# Patient Record
Sex: FEMALE | Race: BLACK OR AFRICAN AMERICAN | NOT HISPANIC OR LATINO
[De-identification: names, ages, dates, MRNs, and addresses within clinical notes are randomized per-mention and may not be internally consistent; named-entity substitution may affect disease eponyms.]

---

## 2017-01-25 ENCOUNTER — APPOINTMENT (OUTPATIENT)
Dept: NEUROLOGY | Facility: CLINIC | Age: 42
End: 2017-01-25

## 2017-01-30 ENCOUNTER — APPOINTMENT (OUTPATIENT)
Dept: OTOLARYNGOLOGY | Facility: CLINIC | Age: 42
End: 2017-01-30

## 2017-01-30 VITALS
WEIGHT: 120 LBS | BODY MASS INDEX: 18.83 KG/M2 | SYSTOLIC BLOOD PRESSURE: 104 MMHG | HEART RATE: 72 BPM | HEIGHT: 67 IN | DIASTOLIC BLOOD PRESSURE: 62 MMHG

## 2017-04-24 ENCOUNTER — APPOINTMENT (OUTPATIENT)
Dept: OTOLARYNGOLOGY | Facility: CLINIC | Age: 42
End: 2017-04-24

## 2018-04-21 ENCOUNTER — EMERGENCY (EMERGENCY)
Facility: HOSPITAL | Age: 43
LOS: 1 days | End: 2018-04-21
Attending: EMERGENCY MEDICINE
Payer: MEDICAID

## 2018-04-21 VITALS
TEMPERATURE: 98 F | RESPIRATION RATE: 17 BRPM | DIASTOLIC BLOOD PRESSURE: 77 MMHG | OXYGEN SATURATION: 100 % | HEART RATE: 71 BPM | SYSTOLIC BLOOD PRESSURE: 122 MMHG

## 2018-04-21 PROCEDURE — 99282 EMERGENCY DEPT VISIT SF MDM: CPT

## 2018-04-21 PROCEDURE — 99283 EMERGENCY DEPT VISIT LOW MDM: CPT

## 2018-04-21 RX ORDER — IBUPROFEN 200 MG
600 TABLET ORAL ONCE
Qty: 0 | Refills: 0 | Status: COMPLETED | OUTPATIENT
Start: 2018-04-21 | End: 2018-04-21

## 2018-04-21 RX ORDER — LIDOCAINE 4 G/100G
1 CREAM TOPICAL ONCE
Qty: 0 | Refills: 0 | Status: COMPLETED | OUTPATIENT
Start: 2018-04-21 | End: 2018-04-21

## 2018-04-21 NOTE — ED PROVIDER NOTE - MEDICAL DECISION MAKING DETAILS
Pt with multiple chronic problems in ED today with pain in left upper back worse with movement of left arm ant pressing over left trapezius ambulatory very labile mood refusing ALL meds. Advised NSAIDs Lidocaine patch will reassess

## 2018-04-21 NOTE — ED PROVIDER NOTE - ATTENDING CONTRIBUTION TO CARE
I have seen and evaluated this patient with the advance practice clinician.   I agree with the findings  unless other wise stated. I have amended notes where needed.  After my face to face bedside evaluation, I am noting: Pt with multiple chronic problems in ED today with pain in left upper back worse with movement of left arm ant pressing over left trapezius ambulatory very labile mood refusing ALL meds. Advised NSAIDs Lidocaine patch will reassess --Hurt

## 2018-04-21 NOTE — ED PROVIDER NOTE - PROGRESS NOTE DETAILS
Stable. AAOX3, NAD, VSS.   Pt with spouse. Discussed need good follow up with PCP and neurologist, pt not agreeable to plan for pain medication ibuprofen, lidoderm patch for relief of pain. She states "I don't like to take medications." Patient able to ambulate w/o difficulty.  The patient has decided to leave against medical advice.  The patient is AAOx3, not intoxicated, and displays normal decision making ability. Pt eloped.

## 2018-04-21 NOTE — ED PROVIDER NOTE - OBJECTIVE STATEMENT
43 Yo female Pmhx Menieres disease (right ear hearing loss), cluster headaches, [place] disease, Cervical herniation 10 years ago, LMP 3/2/18 presents to ED c/o head and neck pain x 1 month. She notes intermittent left neck pain "crampy" that radiates up posterior neck to head, and weakness in left shoulder. She notes blurry vision occasionally.  Denies numbness, tingling sensation down arm. She has not followed up with her neurologist since one year ago stating, she does not like taking medications, noritriptline gives her side effects of SOB and palpitations. She does not take any OTC pain medications. Denies fever, chills, trauma, CP, SOB, n/v. Pt states does not have a PCP. Pt saw ENT specialist 2 weeks ago, was given meclizine. She states she currently does not have neck pain and does not want tylenol or motrin.       Neurologist: Dr. Joby Lindsay

## 2018-04-21 NOTE — ED ADULT NURSE NOTE - OBJECTIVE STATEMENT
42 year old male presents to the ED complaining of neck and head pain. As per patient she has a history of cluster headaches, Meniere's disease, and a herniation x 10 years ago on C5 and C6 that 'went away on it's own for years'. On neurological assessment no neuro or focal deficits noted.  Pt also complains of blurred vision. Pt is A&O x 4, VSS, afebrile, ambulating independently. Pt denies fever, chills, NVD, SOB, or chest pain.  at bedside.

## 2018-04-21 NOTE — ED PROVIDER NOTE - FAMILY HISTORY
Father  Still living? Unknown  Family history of Meniere's disease, Age at diagnosis: Age Unknown  Family history of vertigo, Age at diagnosis: Age Unknown

## 2018-04-21 NOTE — ED PROVIDER NOTE - CPE EDP GU CVA TENDER
Pt with tenderness over superior border of trapezius trigger points+ no redness no deformity --Hurt/no tenderness

## 2018-04-24 ENCOUNTER — EMERGENCY (EMERGENCY)
Facility: HOSPITAL | Age: 43
LOS: 1 days | Discharge: HOME | End: 2018-04-24
Attending: EMERGENCY MEDICINE
Payer: MEDICAID

## 2018-04-24 VITALS
TEMPERATURE: 98 F | HEART RATE: 72 BPM | WEIGHT: 117.95 LBS | DIASTOLIC BLOOD PRESSURE: 81 MMHG | OXYGEN SATURATION: 100 % | HEIGHT: 67 IN | RESPIRATION RATE: 16 BRPM | SYSTOLIC BLOOD PRESSURE: 115 MMHG

## 2018-04-24 VITALS
RESPIRATION RATE: 16 BRPM | SYSTOLIC BLOOD PRESSURE: 122 MMHG | TEMPERATURE: 98 F | OXYGEN SATURATION: 99 % | HEART RATE: 76 BPM | DIASTOLIC BLOOD PRESSURE: 78 MMHG

## 2018-04-24 PROCEDURE — 99284 EMERGENCY DEPT VISIT MOD MDM: CPT

## 2018-04-24 PROCEDURE — 72125 CT NECK SPINE W/O DYE: CPT

## 2018-04-24 PROCEDURE — 72125 CT NECK SPINE W/O DYE: CPT | Mod: 26

## 2018-04-24 RX ORDER — LIDOCAINE 4 G/100G
1 CREAM TOPICAL ONCE
Qty: 0 | Refills: 0 | Status: COMPLETED | OUTPATIENT
Start: 2018-04-24 | End: 2018-04-24

## 2018-04-24 RX ORDER — IBUPROFEN 200 MG
600 TABLET ORAL ONCE
Qty: 0 | Refills: 0 | Status: COMPLETED | OUTPATIENT
Start: 2018-04-24 | End: 2018-04-24

## 2018-04-24 RX ADMIN — Medication 600 MILLIGRAM(S): at 13:27

## 2018-04-24 RX ADMIN — Medication 600 MILLIGRAM(S): at 13:36

## 2018-04-24 RX ADMIN — LIDOCAINE 1 PATCH: 4 CREAM TOPICAL at 10:35
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
## 2018-04-24 NOTE — ED PROVIDER NOTE - PROGRESS NOTE DETAILS
CT C-spine negative. Pain mildly improved. Patient to follow up with her doctors regarding Meniere's symptoms.
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
## 2018-05-05 ENCOUNTER — EMERGENCY (EMERGENCY)
Facility: HOSPITAL | Age: 43
LOS: 1 days | Discharge: STILL ADMITTED OR ADMINISTRATIVE | End: 2018-05-05
Admitting: EMERGENCY MEDICINE

## 2018-05-05 VITALS
SYSTOLIC BLOOD PRESSURE: 112 MMHG | OXYGEN SATURATION: 100 % | HEART RATE: 88 BPM | DIASTOLIC BLOOD PRESSURE: 71 MMHG | TEMPERATURE: 99 F | RESPIRATION RATE: 20 BRPM

## 2018-05-05 NOTE — ED ADULT TRIAGE NOTE - CHIEF COMPLAINT QUOTE
Pt arrives from home via EMS with NRB in place. Pt states around 10:45pm she began experiencing left sided chest pain accompanied by SOB and felt "pins and needles" sensations in her left facial area.  Pt states she then took 1 baby aspirin and called 911. Pt states in triage CP has resolved by SOB continues.

## 2018-05-10 ENCOUNTER — EMERGENCY (EMERGENCY)
Facility: HOSPITAL | Age: 43
LOS: 1 days | Discharge: HOME | End: 2018-05-10
Attending: EMERGENCY MEDICINE
Payer: MEDICAID

## 2018-05-10 VITALS
OXYGEN SATURATION: 100 % | RESPIRATION RATE: 18 BRPM | DIASTOLIC BLOOD PRESSURE: 70 MMHG | HEART RATE: 70 BPM | SYSTOLIC BLOOD PRESSURE: 115 MMHG

## 2018-05-10 VITALS
RESPIRATION RATE: 18 BRPM | TEMPERATURE: 98 F | SYSTOLIC BLOOD PRESSURE: 129 MMHG | DIASTOLIC BLOOD PRESSURE: 83 MMHG | HEART RATE: 83 BPM | OXYGEN SATURATION: 97 %

## 2018-05-10 PROCEDURE — 93005 ELECTROCARDIOGRAM TRACING: CPT

## 2018-05-10 PROCEDURE — 96374 THER/PROPH/DIAG INJ IV PUSH: CPT

## 2018-05-10 PROCEDURE — 99284 EMERGENCY DEPT VISIT MOD MDM: CPT | Mod: 25

## 2018-05-10 PROCEDURE — 93010 ELECTROCARDIOGRAM REPORT: CPT

## 2018-05-10 RX ORDER — SODIUM CHLORIDE 9 MG/ML
1000 INJECTION INTRAMUSCULAR; INTRAVENOUS; SUBCUTANEOUS ONCE
Qty: 0 | Refills: 0 | Status: COMPLETED | OUTPATIENT
Start: 2018-05-10 | End: 2018-05-10

## 2018-05-10 RX ORDER — FAMOTIDINE 10 MG/ML
20 INJECTION INTRAVENOUS ONCE
Qty: 0 | Refills: 0 | Status: COMPLETED | OUTPATIENT
Start: 2018-05-10 | End: 2018-05-10

## 2018-05-10 RX ORDER — LIDOCAINE 4 G/100G
15 CREAM TOPICAL ONCE
Qty: 0 | Refills: 0 | Status: COMPLETED | OUTPATIENT
Start: 2018-05-10 | End: 2018-05-10

## 2018-05-10 RX ADMIN — Medication 30 MILLILITER(S): at 19:13

## 2018-05-10 RX ADMIN — FAMOTIDINE 20 MILLIGRAM(S): 10 INJECTION INTRAVENOUS at 19:13

## 2018-05-10 RX ADMIN — SODIUM CHLORIDE 1000 MILLILITER(S): 9 INJECTION INTRAMUSCULAR; INTRAVENOUS; SUBCUTANEOUS at 19:14

## 2018-05-10 RX ADMIN — LIDOCAINE 15 MILLILITER(S): 4 CREAM TOPICAL at 19:13

## 2018-05-10 NOTE — ED PROVIDER NOTE - PLAN OF CARE
You have GERD (Gastroesophageal Reflux Disease), also known as reflux. This can be caused by a variety of factors including genetics, bacteria, and types of medicines taken. We recommend Pepcid over-the-counter, 20 mg once per day. In addition, we recommend, Maalox or TUMS with meals 2-3 times per day only as needed if the symptoms are not controlled. In addition, please follow up with your Cardiologist, and your Primary Care Provider (PCP).

## 2018-05-10 NOTE — ED ADULT NURSE NOTE - OBJECTIVE STATEMENT
42 year old female a/ox3 ambulatory presenting to ed with worsening mid epigastric burning that is worse after eating any type of food or drink x 1 week. per patient she begins to have burning that is only relieved after burping or belching. denies fever/chills/chest pain. patient does verbalize shortness of breath intermittently with mild numbness down left arm. has recently has mri of brain due to numbness in arm which patient states was negative. no cardiac hx, non smoker, no Hormonal therapy, no recent long travel. respirations even unlabored no sob/dyspnea abd soft nondistended +bs nontender skin dry warm intact nogueira equally.

## 2018-05-10 NOTE — ED ADULT NURSE REASSESSMENT NOTE - NS ED NURSE REASSESS COMMENT FT1
Report received from MADHAV Scott RN. Pt. is awake, easy work of breathing. VSS. IV fluids at this time. Safety maintained. Will continue to monitor.

## 2018-05-10 NOTE — ED PROVIDER NOTE - MEDICAL DECISION MAKING DETAILS
43 yo F with a PMH Meniere's and Gilbert's disease who presents with 1 week of reflux sxs and SOB/orthopnea. Pt with epigastric abdominal pain, no NSAID use, but sxs are gastritic in appearance and have improved in the past with reflux medication. Therefore, no reason to suspect cardiac etiology if EKG normal. Pt slightly nauseous, dry, overall nontoxic, vitals stable. Will give IVFs, Pepcid, Maalox, viscous lidocaine. No need for labwork unless sxs do not improve with above treatment. Pt with normal neuro exam, should f/u outpatient for neuro sxs. 43 yo F with a PMH Meniere's and Gilbert's disease who presents with 1 week of reflux sxs and SOB/orthopnea. Pt with epigastric abdominal pain, no NSAID use, but sxs are gastritic in appearance and have improved in the past with reflux medication. Therefore, no reason to suspect cardiac etiology if EKG normal. Pt slightly nauseous, dry, overall nontoxic, vitals stable. Will give IVFs, Pepcid, Maalox, viscous lidocaine. No need for labwork unless sxs do not improve with above treatment. Pt with normal neuro exam, should f/u outpatient for neuro sxs.    DELPHINE Castanon MD: Pt is a 41 y/o female with a PMH Meniere's and Gilbert's disease who presents with 1 week of reflux and abdominal burning. The symptoms always occur 10-20 minutes after eating. She also endorses SOB during those periods as well. She denies any chest pain but notes palpitations. Her abdominal pain is epigastric, burning, and radiating upward, to the point she has numbness in the left side of her face. The numbness episodes had also been occurring separately prior to the abdominal symptoms, and they would last a few hours a day every few days for a week. No headache or blurry vision. Last BM was yesterday, no melena. Pt endorses nausea and vomiting, without hematemesis. Last emesis was this morning at 10AM. Pt denies spicy or fatty food intake. She came to the ED 5 days ago at [facility] by ambulance for the same symptoms, however, her sx improved and there was a wait so she LWBS. She was seen by her cardiologist, who performed a Holter monitor, which she returned today. Denies fevers or chills. Does not take any medicines for her symptoms.  She notes she had felt like this 20 years ago and had taken Ranitidine before and felt better. DDx: GERD, gastritis, PUD, pancreatitis, LFT abnormality. Plan: basic labs, GI cocktail, re-assess

## 2018-05-10 NOTE — ED PROVIDER NOTE - CARE PLAN
Principal Discharge DX:	GERD (gastroesophageal reflux disease)  Assessment and plan of treatment:	You have GERD (Gastroesophageal Reflux Disease), also known as reflux. This can be caused by a variety of factors including genetics, bacteria, and types of medicines taken. We recommend Pepcid over-the-counter, 20 mg once per day. In addition, we recommend, Maalox or TUMS with meals 2-3 times per day only as needed if the symptoms are not controlled. In addition, please follow up with your Cardiologist, and your Primary Care Provider (PCP).

## 2018-05-10 NOTE — ED PROVIDER NOTE - OBJECTIVE STATEMENT
43 yo F with a PMH Meniere's and Gilbert's disease who presents with 1 week of reflux and abdominal burning. The symptoms always occur 10-20 minutes after eating. She also endorses SOB during those periods as well. She denies any chest pain but notes palpitations. Her abdominal pain is epigastric, burning, and radiating upward, to the point she has numbness in the left side of her face. The numbness episodes had also been occurring separately prior to the abdominal symptoms, and they would last a few hours a day every few days for a week. No headache or blurry vision. Last BM was yesterday, no melena. Pt endorses nausea and vomiting, without hematemesis. Last emesis was this morning at 10AM. Pt denies spicy or fatty food intake. She came to the ED 5 days ago at [facility] by ambulance for the same symptoms (although her abdominal burning did not radiate at that time) and was placed on oxygen but she left prior to being seen by a physician because her symptoms improved. She was seen by her cardiologist, who performed a Holter monitor, which she returned today. No fevers or chills. She did not take any medicines for her symptoms because she wanted to talk to a physician first. She notes she had felt like this 20 years ago and had taken Ranitidine before and felt better.

## 2018-05-14 ENCOUNTER — APPOINTMENT (OUTPATIENT)
Dept: NEUROLOGY | Facility: CLINIC | Age: 43
End: 2018-05-14
Payer: MEDICAID

## 2018-05-14 VITALS
SYSTOLIC BLOOD PRESSURE: 120 MMHG | BODY MASS INDEX: 18.83 KG/M2 | HEIGHT: 67 IN | HEART RATE: 72 BPM | DIASTOLIC BLOOD PRESSURE: 77 MMHG | WEIGHT: 120 LBS

## 2018-05-14 PROCEDURE — 99215 OFFICE O/P EST HI 40 MIN: CPT

## 2018-05-14 RX ORDER — CYCLOBENZAPRINE HYDROCHLORIDE 5 MG/1
5 TABLET, FILM COATED ORAL
Qty: 30 | Refills: 1 | Status: ACTIVE | COMMUNITY
Start: 2018-05-14 | End: 1900-01-01

## 2018-05-18 NOTE — ED PROVIDER NOTE - NEUROLOGICAL PRONATOR DRIFT
no loss of consciousness, no gait abnormality, no headache, no sensory deficits, and no weakness.
none

## 2018-07-04 ENCOUNTER — EMERGENCY (EMERGENCY)
Facility: HOSPITAL | Age: 43
LOS: 1 days | End: 2018-07-04
Attending: EMERGENCY MEDICINE | Admitting: EMERGENCY MEDICINE
Payer: MEDICAID

## 2018-07-04 VITALS
RESPIRATION RATE: 16 BRPM | WEIGHT: 117.95 LBS | DIASTOLIC BLOOD PRESSURE: 86 MMHG | TEMPERATURE: 98 F | SYSTOLIC BLOOD PRESSURE: 125 MMHG | HEART RATE: 83 BPM | OXYGEN SATURATION: 100 %

## 2018-07-04 VITALS
RESPIRATION RATE: 18 BRPM | DIASTOLIC BLOOD PRESSURE: 73 MMHG | HEART RATE: 72 BPM | SYSTOLIC BLOOD PRESSURE: 115 MMHG | OXYGEN SATURATION: 100 %

## 2018-07-04 LAB
ALBUMIN SERPL ELPH-MCNC: 4.7 G/DL — SIGNIFICANT CHANGE UP (ref 3.3–5)
ALP SERPL-CCNC: 47 U/L — SIGNIFICANT CHANGE UP (ref 40–120)
ALT FLD-CCNC: 20 U/L — SIGNIFICANT CHANGE UP (ref 10–45)
ANION GAP SERPL CALC-SCNC: 16 MMOL/L — SIGNIFICANT CHANGE UP (ref 5–17)
APPEARANCE UR: CLEAR — SIGNIFICANT CHANGE UP
AST SERPL-CCNC: 21 U/L — SIGNIFICANT CHANGE UP (ref 10–40)
BASOPHILS # BLD AUTO: 0.1 K/UL — SIGNIFICANT CHANGE UP (ref 0–0.2)
BASOPHILS NFR BLD AUTO: 1.5 % — SIGNIFICANT CHANGE UP (ref 0–2)
BILIRUB SERPL-MCNC: 2.8 MG/DL — HIGH (ref 0.2–1.2)
BILIRUB UR-MCNC: NEGATIVE — SIGNIFICANT CHANGE UP
BUN SERPL-MCNC: 13 MG/DL — SIGNIFICANT CHANGE UP (ref 7–23)
CALCIUM SERPL-MCNC: 9.9 MG/DL — SIGNIFICANT CHANGE UP (ref 8.4–10.5)
CHLORIDE SERPL-SCNC: 100 MMOL/L — SIGNIFICANT CHANGE UP (ref 96–108)
CO2 SERPL-SCNC: 22 MMOL/L — SIGNIFICANT CHANGE UP (ref 22–31)
COLOR SPEC: YELLOW — SIGNIFICANT CHANGE UP
CREAT SERPL-MCNC: 1.07 MG/DL — SIGNIFICANT CHANGE UP (ref 0.5–1.3)
DIFF PNL FLD: ABNORMAL
EOSINOPHIL # BLD AUTO: 0.2 K/UL — SIGNIFICANT CHANGE UP (ref 0–0.5)
EOSINOPHIL NFR BLD AUTO: 4.6 % — SIGNIFICANT CHANGE UP (ref 0–6)
GLUCOSE SERPL-MCNC: 88 MG/DL — SIGNIFICANT CHANGE UP (ref 70–99)
GLUCOSE UR QL: NEGATIVE — SIGNIFICANT CHANGE UP
HCT VFR BLD CALC: 42.4 % — SIGNIFICANT CHANGE UP (ref 34.5–45)
HGB BLD-MCNC: 15.1 G/DL — SIGNIFICANT CHANGE UP (ref 11.5–15.5)
KETONES UR-MCNC: NEGATIVE — SIGNIFICANT CHANGE UP
LEUKOCYTE ESTERASE UR-ACNC: ABNORMAL
LYMPHOCYTES # BLD AUTO: 1.5 K/UL — SIGNIFICANT CHANGE UP (ref 1–3.3)
LYMPHOCYTES # BLD AUTO: 32.5 % — SIGNIFICANT CHANGE UP (ref 13–44)
MCHC RBC-ENTMCNC: 32.3 PG — SIGNIFICANT CHANGE UP (ref 27–34)
MCHC RBC-ENTMCNC: 35.5 GM/DL — SIGNIFICANT CHANGE UP (ref 32–36)
MCV RBC AUTO: 91 FL — SIGNIFICANT CHANGE UP (ref 80–100)
MONOCYTES # BLD AUTO: 0.5 K/UL — SIGNIFICANT CHANGE UP (ref 0–0.9)
MONOCYTES NFR BLD AUTO: 10.5 % — SIGNIFICANT CHANGE UP (ref 2–14)
NEUTROPHILS # BLD AUTO: 2.4 K/UL — SIGNIFICANT CHANGE UP (ref 1.8–7.4)
NEUTROPHILS NFR BLD AUTO: 50.8 % — SIGNIFICANT CHANGE UP (ref 43–77)
NITRITE UR-MCNC: NEGATIVE — SIGNIFICANT CHANGE UP
PH UR: 6 — SIGNIFICANT CHANGE UP (ref 5–8)
PLATELET # BLD AUTO: 191 K/UL — SIGNIFICANT CHANGE UP (ref 150–400)
POTASSIUM SERPL-MCNC: 4.2 MMOL/L — SIGNIFICANT CHANGE UP (ref 3.5–5.3)
POTASSIUM SERPL-SCNC: 4.2 MMOL/L — SIGNIFICANT CHANGE UP (ref 3.5–5.3)
PROT SERPL-MCNC: 8.1 G/DL — SIGNIFICANT CHANGE UP (ref 6–8.3)
PROT UR-MCNC: 30 MG/DL
RBC # BLD: 4.66 M/UL — SIGNIFICANT CHANGE UP (ref 3.8–5.2)
RBC # FLD: 11.5 % — SIGNIFICANT CHANGE UP (ref 10.3–14.5)
SODIUM SERPL-SCNC: 138 MMOL/L — SIGNIFICANT CHANGE UP (ref 135–145)
SP GR SPEC: >1.03 — HIGH (ref 1.01–1.02)
UROBILINOGEN FLD QL: 1 MG/DL
WBC # BLD: 4.7 K/UL — SIGNIFICANT CHANGE UP (ref 3.8–10.5)
WBC # FLD AUTO: 4.7 K/UL — SIGNIFICANT CHANGE UP (ref 3.8–10.5)

## 2018-07-04 PROCEDURE — 99284 EMERGENCY DEPT VISIT MOD MDM: CPT | Mod: 25

## 2018-07-04 PROCEDURE — 82962 GLUCOSE BLOOD TEST: CPT

## 2018-07-04 PROCEDURE — 85027 COMPLETE CBC AUTOMATED: CPT

## 2018-07-04 PROCEDURE — 81001 URINALYSIS AUTO W/SCOPE: CPT

## 2018-07-04 PROCEDURE — 80053 COMPREHEN METABOLIC PANEL: CPT

## 2018-07-04 PROCEDURE — 99283 EMERGENCY DEPT VISIT LOW MDM: CPT

## 2018-07-04 RX ORDER — SODIUM CHLORIDE 9 MG/ML
500 INJECTION INTRAMUSCULAR; INTRAVENOUS; SUBCUTANEOUS ONCE
Qty: 0 | Refills: 0 | Status: COMPLETED | OUTPATIENT
Start: 2018-07-04 | End: 2018-07-04

## 2018-07-04 RX ORDER — ACETAMINOPHEN 500 MG
500 TABLET ORAL ONCE
Qty: 0 | Refills: 0 | Status: DISCONTINUED | OUTPATIENT
Start: 2018-07-04 | End: 2018-07-08

## 2018-07-04 RX ADMIN — SODIUM CHLORIDE 500 MILLILITER(S): 9 INJECTION INTRAMUSCULAR; INTRAVENOUS; SUBCUTANEOUS at 08:16

## 2018-07-04 NOTE — ED PROVIDER NOTE - PROGRESS NOTE DETAILS
Patient was reevaluated and states that is feeling better although she still continues with mild blurry vision. Physical exam unremarkable. Labs unremarkable except for mild elevation of bilirubin but patient has history of Gilbert' disease. Was counselled that she will be discharged home with follow up with an Ophthalmologist.  Juan J Vallejo MD

## 2018-07-04 NOTE — ED ADULT NURSE NOTE - CHPI ED SYMPTOMS NEG
no pain/no vomiting/no tingling/no decreased eating/drinking/no dizziness/no fever/no weakness/no numbness/no chills

## 2018-07-04 NOTE — ED PROVIDER NOTE - CARE PLAN
Principal Discharge DX:	Blurry vision  Assessment and plan of treatment:	Patient presented with blurry vision that is on and off since 2 weeks ago, usually associated when she is very hungry. Physical exam unremarkable. Patient states that she had a recent MRA that was also unremarkable and has good follow up with a Neurologist. Glucose levels were within normal limits. Will discharge home with follow up with primary care physician and an ophthalmologist to evaluate for her blurry vision.

## 2018-07-04 NOTE — ED PROVIDER NOTE - OBJECTIVE STATEMENT
41 y/o female with PMHx of Meniere's disease, migraine headaches and Gilbert's disease presenting to the ED due to blurry vision for the past 2 weeks. She states that before symptoms started she didn't have problems with her vision and denies use of corrective eye lenses. Patient also reports that she has been having increased periods of thirst since a few months ago and increased urine. Denies prior evaluation for Diabetes Mellitus or past family history. Patient states that she suffers from migraine headaches but the blurry vision is not associated with headache. She had an MRA a few months ago this year at another institution due to migraine headaches and states that it was unremarkable. Denies any tingling or numbness, headache, changes in hearing, changes in menstrual cycle, no fever.

## 2018-07-04 NOTE — ED PROVIDER NOTE - PLAN OF CARE
Patient presented with blurry vision that is on and off since 2 weeks ago, usually associated when she is very hungry. Physical exam unremarkable. Patient states that she had a recent MRA that was also unremarkable and has good follow up with a Neurologist. Glucose levels were within normal limits. Will discharge home with follow up with primary care physician and an ophthalmologist to evaluate for her blurry vision.

## 2018-07-04 NOTE — ED ADULT NURSE NOTE - OBJECTIVE STATEMENT
Female 42 years old with history of Migraine headache came in for increasing thirst for years and for the last two weeks with intermittent blurry of vision. With nausea last night but no vomiting/diarrhea. Denies fever, chills, chest pain and sob. Pt reports she had MRI of brain done recently with negative result. Reports mild burning on urination sometimes. Denies blood in urine. Will monitor.

## 2018-07-04 NOTE — ED PROVIDER NOTE - MEDICAL DECISION MAKING DETAILS
41 y/o female with PMHx of Meniere's disease and Gilbert's disease presenting to the ED due to blurry vision since 2 weeks ago. She also had increased thirst and urinary frequency for a few months ago. Physical exam in the ED was unremarkable, no focal neurologic deficits were found. Labs show normal glucose levels, mild elevated bilirubin level. P

## 2018-07-06 ENCOUNTER — APPOINTMENT (OUTPATIENT)
Dept: OPHTHALMOLOGY | Facility: CLINIC | Age: 43
End: 2018-07-06

## 2018-07-06 ENCOUNTER — OUTPATIENT (OUTPATIENT)
Dept: OUTPATIENT SERVICES | Facility: HOSPITAL | Age: 43
LOS: 1 days | End: 2018-07-06

## 2018-07-08 ENCOUNTER — EMERGENCY (EMERGENCY)
Facility: HOSPITAL | Age: 43
LOS: 1 days | Discharge: HOME | End: 2018-07-08
Attending: EMERGENCY MEDICINE
Payer: MEDICAID

## 2018-07-08 VITALS
SYSTOLIC BLOOD PRESSURE: 142 MMHG | DIASTOLIC BLOOD PRESSURE: 93 MMHG | OXYGEN SATURATION: 100 % | RESPIRATION RATE: 16 BRPM | HEART RATE: 91 BPM | TEMPERATURE: 98 F

## 2018-07-08 LAB
ALBUMIN SERPL ELPH-MCNC: 5 G/DL — SIGNIFICANT CHANGE UP (ref 3.3–5)
ALP SERPL-CCNC: 60 U/L — SIGNIFICANT CHANGE UP (ref 40–120)
ALT FLD-CCNC: 22 U/L — SIGNIFICANT CHANGE UP (ref 10–45)
ANION GAP SERPL CALC-SCNC: 15 MMOL/L — SIGNIFICANT CHANGE UP (ref 5–17)
APPEARANCE UR: CLEAR — SIGNIFICANT CHANGE UP
AST SERPL-CCNC: 24 U/L — SIGNIFICANT CHANGE UP (ref 10–40)
BACTERIA # UR AUTO: ABNORMAL /HPF
BASOPHILS # BLD AUTO: 0 K/UL — SIGNIFICANT CHANGE UP (ref 0–0.2)
BASOPHILS NFR BLD AUTO: 0.9 % — SIGNIFICANT CHANGE UP (ref 0–2)
BILIRUB SERPL-MCNC: 1.6 MG/DL — HIGH (ref 0.2–1.2)
BILIRUB UR-MCNC: NEGATIVE — SIGNIFICANT CHANGE UP
BUN SERPL-MCNC: 12 MG/DL — SIGNIFICANT CHANGE UP (ref 7–23)
CALCIUM SERPL-MCNC: 10 MG/DL — SIGNIFICANT CHANGE UP (ref 8.4–10.5)
CHLORIDE SERPL-SCNC: 100 MMOL/L — SIGNIFICANT CHANGE UP (ref 96–108)
CO2 SERPL-SCNC: 21 MMOL/L — LOW (ref 22–31)
COLOR SPEC: COLORLESS — SIGNIFICANT CHANGE UP
CREAT SERPL-MCNC: 0.87 MG/DL — SIGNIFICANT CHANGE UP (ref 0.5–1.3)
DIFF PNL FLD: NEGATIVE — SIGNIFICANT CHANGE UP
EOSINOPHIL # BLD AUTO: 0.3 K/UL — SIGNIFICANT CHANGE UP (ref 0–0.5)
EOSINOPHIL NFR BLD AUTO: 5 % — SIGNIFICANT CHANGE UP (ref 0–6)
EPI CELLS # UR: SIGNIFICANT CHANGE UP /HPF
GLUCOSE SERPL-MCNC: 83 MG/DL — SIGNIFICANT CHANGE UP (ref 70–99)
GLUCOSE UR QL: NEGATIVE — SIGNIFICANT CHANGE UP
HCT VFR BLD CALC: 42.4 % — SIGNIFICANT CHANGE UP (ref 34.5–45)
HGB BLD-MCNC: 14.6 G/DL — SIGNIFICANT CHANGE UP (ref 11.5–15.5)
KETONES UR-MCNC: NEGATIVE — SIGNIFICANT CHANGE UP
LEUKOCYTE ESTERASE UR-ACNC: NEGATIVE — SIGNIFICANT CHANGE UP
LYMPHOCYTES # BLD AUTO: 1.5 K/UL — SIGNIFICANT CHANGE UP (ref 1–3.3)
LYMPHOCYTES # BLD AUTO: 27.3 % — SIGNIFICANT CHANGE UP (ref 13–44)
MCHC RBC-ENTMCNC: 31 PG — SIGNIFICANT CHANGE UP (ref 27–34)
MCHC RBC-ENTMCNC: 34.4 GM/DL — SIGNIFICANT CHANGE UP (ref 32–36)
MCV RBC AUTO: 90.2 FL — SIGNIFICANT CHANGE UP (ref 80–100)
MONOCYTES # BLD AUTO: 0.6 K/UL — SIGNIFICANT CHANGE UP (ref 0–0.9)
MONOCYTES NFR BLD AUTO: 11.7 % — SIGNIFICANT CHANGE UP (ref 2–14)
NEUTROPHILS # BLD AUTO: 3 K/UL — SIGNIFICANT CHANGE UP (ref 1.8–7.4)
NEUTROPHILS NFR BLD AUTO: 55 % — SIGNIFICANT CHANGE UP (ref 43–77)
NITRITE UR-MCNC: NEGATIVE — SIGNIFICANT CHANGE UP
PH UR: 6.5 — SIGNIFICANT CHANGE UP (ref 5–8)
PLATELET # BLD AUTO: 198 K/UL — SIGNIFICANT CHANGE UP (ref 150–400)
POTASSIUM SERPL-MCNC: 4.9 MMOL/L — SIGNIFICANT CHANGE UP (ref 3.5–5.3)
POTASSIUM SERPL-SCNC: 4.9 MMOL/L — SIGNIFICANT CHANGE UP (ref 3.5–5.3)
PROT SERPL-MCNC: 8.2 G/DL — SIGNIFICANT CHANGE UP (ref 6–8.3)
PROT UR-MCNC: NEGATIVE — SIGNIFICANT CHANGE UP
RBC # BLD: 4.7 M/UL — SIGNIFICANT CHANGE UP (ref 3.8–5.2)
RBC # FLD: 11.5 % — SIGNIFICANT CHANGE UP (ref 10.3–14.5)
SODIUM SERPL-SCNC: 136 MMOL/L — SIGNIFICANT CHANGE UP (ref 135–145)
SP GR SPEC: <1.005 — LOW (ref 1.01–1.02)
UROBILINOGEN FLD QL: NEGATIVE — SIGNIFICANT CHANGE UP
WBC # BLD: 5.4 K/UL — SIGNIFICANT CHANGE UP (ref 3.8–10.5)
WBC # FLD AUTO: 5.4 K/UL — SIGNIFICANT CHANGE UP (ref 3.8–10.5)
WBC UR QL: SIGNIFICANT CHANGE UP /HPF (ref 0–5)

## 2018-07-08 PROCEDURE — 81001 URINALYSIS AUTO W/SCOPE: CPT

## 2018-07-08 PROCEDURE — 82962 GLUCOSE BLOOD TEST: CPT

## 2018-07-08 PROCEDURE — 99283 EMERGENCY DEPT VISIT LOW MDM: CPT

## 2018-07-08 PROCEDURE — 85027 COMPLETE CBC AUTOMATED: CPT

## 2018-07-08 PROCEDURE — 80053 COMPREHEN METABOLIC PANEL: CPT

## 2018-07-08 PROCEDURE — 99284 EMERGENCY DEPT VISIT MOD MDM: CPT

## 2018-07-08 NOTE — ED PROVIDER NOTE - CARE PLAN
Principal Discharge DX:	Hypoglycemia  Assessment and plan of treatment:	1) Please follow-up with an endocrinologist within the next 3 days.  Please call today or tomorrow for an appointment.  If you cannot follow-up with your doctor(s), please return to the ED for any urgent issues.  2) If you have any worsening of symptoms or any other concerns please return to the ED immediately.  3) Please continue taking your home medications as directed.  4) You may have been given a copy of your labs and/or imaging.  Please go over these with your primary care doctor.

## 2018-07-08 NOTE — ED PROVIDER NOTE - NS ED ROS FT
Constitutional: no fevers/chills, + weakness  HEENT: no visual changes, no sore throat, no rhinorrhea  CV: no cp  Resp: no sob  GI: no abd pain, n/v, diarrhea/constipation  : no dysuria, hematuria  MSK: no joint pains  skin: no rashes  neuro: + HA, no confusion  psych: no SI/HI

## 2018-07-08 NOTE — ED ADULT NURSE NOTE - OBJECTIVE STATEMENT
BIBA from home c/o episode of hypoglycemia this morning at home. States she felt lightheaded and felt "numb" and "tingly" and check her blood glucose which was 60. Patient reports she drank milk and called 911. She also drank juice on the way to the hospital in the ambulance. At this time denies any chest pain, sob, lightheadedness , numbness or weakness. Patient states she has had a hx of hypoglycemia for years now but has not been referred to an endocrinologist by her PMD. Patient c/o dysuria one week ago but states she no longer has any symptoms. Skin is warm dry and intact.

## 2018-07-08 NOTE — ED PROVIDER NOTE - ATTENDING CONTRIBUTION TO CARE
Attending MD Bailey:  I personally have seen and examined this patient.  Resident note reviewed and agree on plan of care and except where noted.  See MDM for details.

## 2018-07-08 NOTE — ED PROVIDER NOTE - PLAN OF CARE
1) Please follow-up with an endocrinologist within the next 3 days.  Please call today or tomorrow for an appointment.  If you cannot follow-up with your doctor(s), please return to the ED for any urgent issues.  2) If you have any worsening of symptoms or any other concerns please return to the ED immediately.  3) Please continue taking your home medications as directed.  4) You may have been given a copy of your labs and/or imaging.  Please go over these with your primary care doctor.

## 2018-07-08 NOTE — ED ADULT NURSE NOTE - CHIEF COMPLAINT QUOTE
As per pt she started to experience numbness to her body  this morning and she checked her Bs it was 60 mg/ dl. pt reports that she had similar episodes in the past and has being ongoing for years.  Pt report that she has no complaint at this time.

## 2018-07-08 NOTE — ED ADULT TRIAGE NOTE - CHIEF COMPLAINT QUOTE
As per pt she started to experience numbness to her body and she checked her Bs it was 60 mg/ dl. As per pt she started to experience numbness to her body  this morning and she checked her Bs it was 60 mg/ dl. pt reports that she had similar episodes in the past and has being ongoing for years.  Pt report that she has no complaint at this time.

## 2018-07-08 NOTE — ED PROVIDER NOTE - PHYSICAL EXAMINATION
Vitals: WNL  Gen: laying comfortably in NAD  Head: NCAT  ENT: sclerae white, anicterus, moist mucous membranes. No exudates.   CV: RRR, nl s1/s2, no m/r/g  Pulm: Clear to auscultation bilaterally. No wheezes, rales, or rhonchi  Abd: soft, normoactive BS x4, NTND, no rebound, no guarding, no rashes  Musculoskeletal:  No peripheral edema  Skin: no lesions or scars noted  Neurologic: AAOx3  : no CVA tenderness

## 2018-07-08 NOTE — ED PROVIDER NOTE - OBJECTIVE STATEMENT
43yo F h/o hypoglycemia, Meniere's disease, Gilbert's syndrome, migraines p/w hypoglycemia. Pt reports this morning she suddenly started feeling tingling of her legs, HA and lightheaded which are symptoms she feels when she is hypoglycemic. Drank some milk and FS 60. Called EMS who gave her some juice and FS 80s. Currently feels fine with exception of minor HA. No new changes in medication, nondiabetic, doesn't take insulin. Had a normal full dinner last night, didn't eat breakfast. Has not seen an endocrinologist. Pt knows inciting factors are not eating and stress. Had been told by PCP to record FS throughotu day.

## 2018-07-08 NOTE — ED PROVIDER NOTE - MEDICAL DECISION MAKING DETAILS
41yo F h/o hypoglycemia, Meniere's disease, Gilbert's syndrome, migraines p/w hypoglycemia. Pt with h/o hypoglycemia, no inciting factor. Will get basic labs, ua, upreg, monitor FS. Likely d/c home with endocrine f/u. 43yo F h/o hypoglycemia, Meniere's disease, Gilbert's syndrome, migraines p/w hypoglycemia. Pt with h/o hypoglycemia, no inciting factor. Will get basic labs, ua, upreg, monitor FS. Likely d/c home with endocrine f/u.    Attending MD Bailey: 43yo F h/o hypoglycemia, Meniere's disease, Gilbert's syndrome, migraines p/w hypoglycemia. Pt reports this morning she suddenly started feeling tingling of her legs, HA and lightheaded which are symptoms she feels when she is hypoglycemic. Drank some milk and FS 60.  EMS called and given juice.  Patient feel fine now.  No physical exam abnormalities  and will check labs. No hx DM and on no meds that would cause hypoglycemia.

## 2018-07-17 ENCOUNTER — OUTPATIENT (OUTPATIENT)
Dept: OUTPATIENT SERVICES | Facility: HOSPITAL | Age: 43
LOS: 1 days | End: 2018-07-17
Payer: MEDICAID

## 2018-07-17 ENCOUNTER — APPOINTMENT (OUTPATIENT)
Dept: GASTROENTEROLOGY | Facility: HOSPITAL | Age: 43
End: 2018-07-17
Payer: MEDICAID

## 2018-07-17 VITALS
SYSTOLIC BLOOD PRESSURE: 115 MMHG | DIASTOLIC BLOOD PRESSURE: 76 MMHG | HEART RATE: 67 BPM | HEIGHT: 67 IN | BODY MASS INDEX: 18.52 KG/M2 | WEIGHT: 118 LBS

## 2018-07-17 DIAGNOSIS — K59.00 CONSTIPATION, UNSPECIFIED: ICD-10-CM

## 2018-07-17 DIAGNOSIS — K92.1 MELENA: ICD-10-CM

## 2018-07-17 DIAGNOSIS — R10.13 EPIGASTRIC PAIN: ICD-10-CM

## 2018-07-17 DIAGNOSIS — Z80.0 FAMILY HISTORY OF MALIGNANT NEOPLASM OF DIGESTIVE ORGANS: ICD-10-CM

## 2018-07-17 DIAGNOSIS — K31.9 DISEASE OF STOMACH AND DUODENUM, UNSPECIFIED: ICD-10-CM

## 2018-07-17 PROBLEM — Z83.71 FAMILY HISTORY OF COLONIC POLYPS: Status: ACTIVE | Noted: 2018-07-17

## 2018-07-17 PROBLEM — E80.4 GILBERT SYNDROME: Chronic | Status: ACTIVE | Noted: 2018-07-04

## 2018-07-17 PROBLEM — K21.9 GASTROESOPHAGEAL REFLUX DISEASE, ESOPHAGITIS PRESENCE NOT SPECIFIED: Status: ACTIVE | Noted: 2018-07-17

## 2018-07-17 PROBLEM — K21.9 ESOPHAGEAL REFLUX: Status: ACTIVE | Noted: 2018-07-17

## 2018-07-17 PROBLEM — K62.5 RECTAL BLEEDING: Status: ACTIVE | Noted: 2018-07-17

## 2018-07-17 PROCEDURE — G0463: CPT

## 2018-07-17 PROCEDURE — 99203 OFFICE O/P NEW LOW 30 MIN: CPT | Mod: GC

## 2018-07-17 RX ORDER — POLYETHYLENE GLYCOL 3350 AND ELECTROLYTES WITH LEMON FLAVOR 236; 22.74; 6.74; 5.86; 2.97 G/4L; G/4L; G/4L; G/4L; G/4L
236 POWDER, FOR SOLUTION ORAL
Qty: 1 | Refills: 0 | Status: ACTIVE | COMMUNITY
Start: 2018-07-17 | End: 1900-01-01

## 2018-07-18 PROBLEM — Z80.0 FAMILY HISTORY OF MALIGNANT NEOPLASM OF COLON: Status: ACTIVE | Noted: 2018-07-18

## 2018-07-18 PROBLEM — R10.13 EPIGASTRIC PAIN: Status: ACTIVE | Noted: 2018-07-18

## 2018-07-18 PROBLEM — K92.1 HEMATOCHEZIA: Status: ACTIVE | Noted: 2018-07-18

## 2018-07-18 PROBLEM — K59.00 CONSTIPATION: Status: ACTIVE | Noted: 2018-07-17

## 2018-07-19 DIAGNOSIS — R10.13 EPIGASTRIC PAIN: ICD-10-CM

## 2018-07-19 DIAGNOSIS — K92.1 MELENA: ICD-10-CM

## 2018-07-19 DIAGNOSIS — K59.00 CONSTIPATION, UNSPECIFIED: ICD-10-CM

## 2018-08-06 ENCOUNTER — APPOINTMENT (OUTPATIENT)
Dept: NEUROLOGY | Facility: CLINIC | Age: 43
End: 2018-08-06

## 2018-08-06 ENCOUNTER — APPOINTMENT (OUTPATIENT)
Dept: OPHTHALMOLOGY | Facility: CLINIC | Age: 43
End: 2018-08-06

## 2018-10-10 ENCOUNTER — APPOINTMENT (OUTPATIENT)
Dept: OPHTHALMOLOGY | Facility: CLINIC | Age: 43
End: 2018-10-10
Payer: MEDICAID

## 2018-10-10 DIAGNOSIS — H40.003 PREGLAUCOMA, UNSPECIFIED, BILATERAL: ICD-10-CM

## 2018-10-10 DIAGNOSIS — H04.123 DRY EYE SYNDROME OF BILATERAL LACRIMAL GLANDS: ICD-10-CM

## 2018-10-10 PROCEDURE — 92004 COMPRE OPH EXAM NEW PT 1/>: CPT

## 2018-10-10 PROCEDURE — 76514 ECHO EXAM OF EYE THICKNESS: CPT

## 2018-10-10 PROCEDURE — 92083 EXTENDED VISUAL FIELD XM: CPT

## 2018-11-26 DIAGNOSIS — H40.003 PREGLAUCOMA, UNSPECIFIED, BILATERAL: ICD-10-CM

## 2019-03-09 ENCOUNTER — TRANSCRIPTION ENCOUNTER (OUTPATIENT)
Age: 44
End: 2019-03-09

## 2019-04-01 ENCOUNTER — OUTPATIENT (OUTPATIENT)
Dept: OUTPATIENT SERVICES | Facility: HOSPITAL | Age: 44
LOS: 1 days | End: 2019-04-01
Payer: MEDICAID

## 2019-04-01 PROCEDURE — G9001: CPT

## 2019-04-10 ENCOUNTER — APPOINTMENT (OUTPATIENT)
Dept: OPHTHALMOLOGY | Facility: CLINIC | Age: 44
End: 2019-04-10

## 2019-04-12 DIAGNOSIS — Z71.89 OTHER SPECIFIED COUNSELING: ICD-10-CM

## 2019-06-27 ENCOUNTER — EMERGENCY (EMERGENCY)
Facility: HOSPITAL | Age: 44
LOS: 1 days | Discharge: HOME | End: 2019-06-27
Attending: EMERGENCY MEDICINE
Payer: COMMERCIAL

## 2019-06-27 VITALS
OXYGEN SATURATION: 97 % | HEART RATE: 84 BPM | WEIGHT: 119.93 LBS | RESPIRATION RATE: 18 BRPM | DIASTOLIC BLOOD PRESSURE: 71 MMHG | TEMPERATURE: 98 F | SYSTOLIC BLOOD PRESSURE: 104 MMHG | HEIGHT: 67 IN

## 2019-06-27 PROCEDURE — 99283 EMERGENCY DEPT VISIT LOW MDM: CPT

## 2019-06-27 PROCEDURE — 71046 X-RAY EXAM CHEST 2 VIEWS: CPT | Mod: 26

## 2019-06-27 PROCEDURE — 71046 X-RAY EXAM CHEST 2 VIEWS: CPT

## 2019-06-27 RX ORDER — DEXTROMETHORPHAN POLISTIREX 30 MG/5 ML
10 SUSPENSION, EXTENDED RELEASE 12 HR ORAL ONCE
Refills: 0 | Status: DISCONTINUED | OUTPATIENT
Start: 2019-06-27 | End: 2019-06-27

## 2019-06-27 RX ORDER — GUAIFENESIN/DEXTROMETHORPHAN 600MG-30MG
10 TABLET, EXTENDED RELEASE 12 HR ORAL ONCE
Refills: 0 | Status: COMPLETED | OUTPATIENT
Start: 2019-06-27 | End: 2019-06-27

## 2019-06-27 RX ORDER — IBUPROFEN 200 MG
600 TABLET ORAL ONCE
Refills: 0 | Status: COMPLETED | OUTPATIENT
Start: 2019-06-27 | End: 2019-06-27

## 2019-06-27 RX ORDER — ACETAMINOPHEN 500 MG
650 TABLET ORAL ONCE
Refills: 0 | Status: COMPLETED | OUTPATIENT
Start: 2019-06-27 | End: 2019-06-27

## 2019-06-27 RX ADMIN — Medication 10 MILLILITER(S): at 04:02

## 2019-06-27 RX ADMIN — Medication 100 MILLIGRAM(S): at 03:32

## 2019-06-27 NOTE — ED ADULT NURSE NOTE - NS_ED_NURSE_TEACHING_TOPIC_ED_A_ED
I have personally performed a face to face diagnostic evaluation on this patient. I have reviewed the ACP note and agree with the history, exam and plan of care, except as noted.
Orthopedic

## 2019-06-27 NOTE — ED ADULT TRIAGE NOTE - ESI TRIAGE ACUITY LEVEL, MLM
"Clinic Action Needed:Yes (notifying UC by phone)  Reason for Call: Father calling: \"He was sent home from  with a temp of 102\".  Child has cough, runny nose and yesterday eyes were red. Mom is 41 weeks pregnant and is scheduled to be induced tomorrow. Child completed MMR vaccines in June of 2017.  Paged on call provider for  Clinic to speak to me at [facility].  Dr. Trammell is on call, connected via cell phone.  Dr. Trammell advised to have child assessed at [facility] to r/o measles.  [facility] in car with another adult, while other adult goes in to speak to , get a face mask and instructions on how to enter the building.  This nurse will call [facility] at 5:00 when they open to alert them we are sending Harry in for assessment.     Routed to: Not routed.\    Jacqueline Torre RN  [facility]        " 4

## 2019-06-27 NOTE — ED PROVIDER NOTE - CLINICAL SUMMARY MEDICAL DECISION MAKING FREE TEXT BOX
43F w/ cough x1 week, no exudates, does not appear bacterial, no respiratory distress, will treat symptomatically

## 2019-06-27 NOTE — ED ADULT NURSE NOTE - CHPI ED NUR SYMPTOMS NEG
no chills/no dizziness/no weakness/no tingling/no decreased eating/drinking/no pain/no nausea/no vomiting

## 2019-06-27 NOTE — ED PROVIDER NOTE - NS ED ROS FT
General: denies chills  HENT: denies nasal congestion, rhinorrhea, + cough  CV: denies chest pain, palpitations  Resp: denies difficulty breathing, cough  Abdominal: denies nausea, vomiting

## 2019-06-27 NOTE — ED ADULT NURSE NOTE - OBJECTIVE STATEMENT
44 yo f reporting to ED for cough. 42 yo f reporting to ED for cough. Pt reporting cough x 1 week after using a "jason". Pt also had subjective fever and an episode of vomiting 1 week ago. Pt AAOx3, NAD, pt speaking in full sentences, respirations spontaneous, non-labored, lungs clear bilat, abdomen soft nontender nondistended, strong peripheral pulses x 4, cap refill < 2 seconds, skin warm and dry. Pt denies headache, dizziness, chest pain, palpitations, SOB, abdominal pain, n/v/d, urinary symptoms, fevers, chills, weakness at this time.  at bedside. Bed locked & in lowest position. Safety maintained. Will continue to reassess.

## 2019-06-27 NOTE — ED PROVIDER NOTE - NSFOLLOWUPINSTRUCTIONS_ED_ALL_ED_FT
Cough    Coughing is a reflex that clears your throat and your airways. Coughing helps to heal and protect your lungs. It is normal to cough occasionally, but a cough that happens with other symptoms or lasts a long time may be a sign of a condition that needs treatment. Coughing may be caused by infections, asthma or COPD, smoking, postnasal drip, gastroesophageal reflux, as well as other medical conditions. Take medicines only as instructed by your health care provider. Avoid environments or triggers that causes you to cough at work or at home.    SEEK IMMEDIATE MEDICAL CARE IF YOU HAVE ANY OF THE FOLLOWING SYMPTOMS: coughing up blood, shortness of breath, rapid heart rate, chest pain, unexplained weight loss or night sweats. Cough    Coughing is a reflex that clears your throat and your airways. Coughing helps to heal and protect your lungs. It is normal to cough occasionally, but a cough that happens with other symptoms or lasts a long time may be a sign of a condition that needs treatment. Coughing may be caused by infections, asthma or COPD, smoking, postnasal drip, gastroesophageal reflux, as well as other medical conditions. Take medicines only as instructed by your health care provider. Avoid environments or triggers that causes you to cough at work or at home.    SEEK IMMEDIATE MEDICAL CARE IF YOU HAVE ANY OF THE FOLLOWING SYMPTOMS: coughing up blood, shortness of breath, rapid heart rate, chest pain, unexplained weight loss or night sweats.    - Follow up with your primary care doctor in 1-2 days.    - Bring results with you to the appointment.   - Take tylenol 650mg or motrin 600mg every 6 hours for pain or fever.   - Return to the ED for new or worsening symptoms.

## 2019-06-27 NOTE — ED PROVIDER NOTE - OBJECTIVE STATEMENT
43F p/w cough x1 week. 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 Cannot sleep due to cough. States she had a fever and nausea 1 week ago but none since. Reports chest pain w/ cough. States she had blood tinged sputum 2 days ago. Denies SOB, difficulty swallowing 43F p/w cough x1 week. 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 Cannot sleep due to cough. States she had a fever and nausea 1 week ago but none since. Reports chest pain w/ cough. States she had blood tinged sputum 2 days ago. Denies SOB, difficulty swallowing    Attendinyo female presents with cough for about 1 week.  no fever/ chills.

## 2019-06-27 NOTE — ED PROVIDER NOTE - PHYSICAL EXAMINATION
Physical Examination: CONSTITUTIONAL: NAD, awake, alert  HEAD: Normocephalic; atraumatic  ENMT: External appears normal, MMM, + mild pharyngeal erythema, no exudates   CARD: Normal Sl, S2; no audible murmurs  RESP: normal wob, lungs ctab  ABD: soft, non-distended; non-tender  SKIN: Warm, dry, no rashes

## 2019-10-29 ENCOUNTER — APPOINTMENT (OUTPATIENT)
Dept: NEUROLOGY | Facility: CLINIC | Age: 44
End: 2019-10-29
Payer: COMMERCIAL

## 2019-10-29 VITALS
HEART RATE: 73 BPM | BODY MASS INDEX: 18.83 KG/M2 | SYSTOLIC BLOOD PRESSURE: 105 MMHG | HEIGHT: 67 IN | DIASTOLIC BLOOD PRESSURE: 66 MMHG | WEIGHT: 120 LBS

## 2019-10-29 PROCEDURE — 99214 OFFICE O/P EST MOD 30 MIN: CPT

## 2019-10-29 RX ORDER — VENLAFAXINE HYDROCHLORIDE 37.5 MG/1
37.5 CAPSULE, EXTENDED RELEASE ORAL
Qty: 30 | Refills: 0 | Status: DISCONTINUED | COMMUNITY
Start: 2016-10-11 | End: 2019-10-29

## 2019-10-29 NOTE — HISTORY OF PRESENT ILLNESS
[FreeTextEntry1] : Patient reports that since last visit last year she was doing well however over the past several months her holocephalic headaches with severe pressure and throbbing and associated nausea and light and sound sensitivity. She will also experience sharp, shooting pains in her head and tingling on her face mainly on the left side. \par Also, visual changes as well and her vestibular symptoms may also be exacerbated. \par At the moment, she denies any major headache or nausea.\par She is currently only using tylenol or excedrin as needed for abortive. \par She was recently diagnosed with a hiatal hernia and cannot take steroids or NSAIDS at the moment.\par

## 2019-10-29 NOTE — REVIEW OF SYSTEMS
[Fever] : no fever [Chills] : no chills [Feeling Tired] : not feeling tired [As Noted in HPI] : as noted in HPI [Abnormal Sensation] : an abnormal sensation [Dizziness] : no dizziness [Lightheadedness] : no lightheadedness [Cluster Headache] : cluster headaches [Tension Headache] : tension-type headaches [Difficulty Walking] : no difficulty walking [Negative] : Heme/Lymph

## 2019-10-29 NOTE — REASON FOR VISIT
[Follow-Up: _____] : a [unfilled] follow-up visit [FreeTextEntry1] : cluster headaches and severe head pain

## 2019-10-29 NOTE — PHYSICAL EXAM
[General Appearance - Alert] : alert [Oriented To Time, Place, And Person] : oriented to person, place, and time [Person] : oriented to person [Place] : oriented to place [Time] : oriented to time [Cranial Nerves Optic (II)] : visual acuity intact bilaterally,  visual fields full to confrontation, pupils equal round and reactive to light [Cranial Nerves Oculomotor (III)] : extraocular motion intact [Cranial Nerves Trigeminal (V)] : facial sensation intact symmetrically [Cranial Nerves Facial (VII)] : face symmetrical [Cranial Nerves Vestibulocochlear (VIII)] : hearing was intact bilaterally [Cranial Nerves Glossopharyngeal (IX)] : tongue and palate midline [Cranial Nerves Accessory (XI - Cranial And Spinal)] : head turning and shoulder shrug symmetric [Cranial Nerves Hypoglossal (XII)] : there was no tongue deviation with protrusion [Motor Tone] : muscle tone was normal in all four extremities [Motor Strength] : muscle strength was normal in all four extremities [Involuntary Movements] : no involuntary movements were seen [No Muscle Atrophy] : normal bulk in all four extremities [Motor Handedness Right-Handed] : the patient is right hand dominant [Paresis Pronator Drift Right-Sided] : no pronator drift on the right [Paresis Pronator Drift Left-Sided] : no pronator drift on the left [Motor Strength Upper Extremities Bilaterally] : strength was normal in both upper extremities [Motor Strength Lower Extremities Bilaterally] : strength was normal in both lower extremities [Sensation Tactile Decrease] : light touch was intact [Sensation Pain / Temperature Decrease] : pain and temperature was intact [Romberg's Sign] : Romberg's sign was negtive [Abnormal Walk] : normal gait [Balance] : balance was intact [Limited Balance] : balance was intact [Past-pointing] : there was no past-pointing [Tremor] : no tremor present [Dysdiadochokinesia Bilaterally] : not present [Coordination - Dysmetria Impaired Finger-to-Nose Bilateral] : not present [Coordination - Dysmetria Impaired Heel-to-Shin Bilateral] : not present [2+] : Ankle jerk left 2+ [Plantar Reflex Right Only] : normal on the right [Plantar Reflex Left Only] : normal on the left [FreeTextEntry8] : Patient able to tandem gait today well [Extraocular Movements] : extraocular movements were intact [Neck Appearance] : the appearance of the neck was normal [Neck Cervical Mass (___cm)] : no neck mass was observed [] : no rash [Skin Lesions] : no skin lesions

## 2019-10-29 NOTE — DISCUSSION/SUMMARY
[FreeTextEntry1] : 1. Cluster vs mixed headache syndrome : will start for prevention verapamil 40mg daily and for abortive will try rizatriptan ODT 10mg as needed along with tylenol and zofran 4 mg TID as needed\par \par 2. Continue headache diary and avoid potential triggers \par \par 3. Encouraged hydration and importance of this \par \par 4. Follow up in 3 months and all questions answered.

## 2019-11-29 ENCOUNTER — RX CHANGE (OUTPATIENT)
Age: 44
End: 2019-11-29

## 2019-11-29 ENCOUNTER — RX RENEWAL (OUTPATIENT)
Age: 44
End: 2019-11-29

## 2019-12-13 ENCOUNTER — EMERGENCY (EMERGENCY)
Facility: HOSPITAL | Age: 44
LOS: 1 days | Discharge: HOME | End: 2019-12-13
Attending: EMERGENCY MEDICINE
Payer: COMMERCIAL

## 2019-12-13 VITALS
DIASTOLIC BLOOD PRESSURE: 69 MMHG | OXYGEN SATURATION: 100 % | HEART RATE: 75 BPM | HEIGHT: 67 IN | RESPIRATION RATE: 18 BRPM | TEMPERATURE: 98 F | SYSTOLIC BLOOD PRESSURE: 105 MMHG | WEIGHT: 121.92 LBS

## 2019-12-13 PROCEDURE — 99283 EMERGENCY DEPT VISIT LOW MDM: CPT

## 2019-12-13 PROCEDURE — 99282 EMERGENCY DEPT VISIT SF MDM: CPT

## 2019-12-13 NOTE — ED PROVIDER NOTE - NSFOLLOWUPCLINICS_GEN_ALL_ED_FT
[facility] - Ophthalmology  Ophthalmology  [address], [address]  [place] [address]  Phone: [phone]  Fax:   Follow Up Time: 7-10 Days

## 2019-12-13 NOTE — ED PROVIDER NOTE - CLINICAL SUMMARY MEDICAL DECISION MAKING FREE TEXT BOX
44 Y F with >1year eye symptoms with HA pain today in temporal region, normal occular pressures, pain not relieved by tetracaine no symptoms of corneal involvement. Will DC pt with opthalmology FU.

## 2019-12-13 NOTE — ED PROVIDER NOTE - NSFOLLOWUPINSTRUCTIONS_ED_ALL_ED_FT
If you have any severe increase in pain, fever, uncontrollable nausea vomiting, or inability to tolerate eating and drinking you need to come right back to the emergency room.     Follow up with the ophthalmologist as we discussed.

## 2019-12-13 NOTE — ED PROVIDER NOTE - ATTENDING CONTRIBUTION TO CARE
Attending MD Crow: I personally have seen and examined this patient.  Resident note reviewed and agree on plan of care and except where noted.  See below for details.     Seen in [facility]    44F with no reported PMH (review of the EMR reveals Ringling, HOWELL, Meniere's, vertigo) presents to the ED with eye pain for at least a year.  Anand has been having this eye pain intermittently for a year, reports alternations as to which eye is being more affected but reports at present R>L.  Reports presents today because had increased pain with reading/close work.  Reports this is not a new aggravating factor, reports rest alleviates.  Reports has been evaluated by an eye doctor.  Reports she was told she could have ocular migraines or glaucoma.  Denies glaucoma meds ever, denies surgery.  Reports pain is located in bitemporal area at this time.  Denies vision changes, nausea, ear pain, epistaxis.  Reports does not use drops or medications, declines any intervention at this time.  Anand was told that she needed to start wearing glasses, reports she did not.  Denies chest pain, shortness of breath, palpitations. Denies abdominal pain, vomiting, diarrhea, blood in stools.  Denies fevers, chills, dizziness, weakness. Denies numbness, weakness or tingling in extremities. head NCAT, PERRL, unlabored breathing, FROM at neck, moving all extremities, EOMI, confrontational VF full, Va OD 20/50,  OS 20/50, no periorbital ecchymosis, no tenderness to palpation of orbital rim, lid margins clear, no retained foreign body on lid eversion, no conjunctival injection, no corneal defect, AC no cells or flare, fundus exam limited, disc margins sharp and flat, T 17 OU; A/P: 44F with >1 yr eye pain, unchanged, previously evaluated by outpatient eye doctor, no acute findings noted.  Discussed as previously told, possibly poor refractive correction, ocular migraines, lower suspicion for ocular pathology given bilateral and unchanged for a year.  Stable for discharge. Follow up instructions given, importance of follow up emphasized, return to ED parameters reviewed and patient verbalized understanding.  All questions answered, all concerns addressed. Patient to follow with ophtho/neuro, reports has names. Gave ophtho clinic in event that she needs it.

## 2019-12-13 NOTE — ED ADULT TRIAGE NOTE - CHIEF COMPLAINT QUOTE
last several days patient has been experiencing right eye pain with light sensitivity. last several days patient has been experiencing right eye pain with light sensitivity. No trauma

## 2019-12-13 NOTE — ED PROVIDER NOTE - PATIENT PORTAL LINK FT
You can access the FollowMyHealth Patient Portal offered by [facility] by registering at the following website: [email or web address]. By joining [employer] [facility]’s FollowMyHealth portal, you will also be able to view your health information using other applications (apps) compatible with our system.

## 2019-12-13 NOTE — ED PROVIDER NOTE - OBJECTIVE STATEMENT
44 Y F with no pmhx states that she has had alternating bl eye pain off and on for over a year. She states that she saw an ophthalmologist about the symptoms was told that it may be glaucoma or occular migraines, never followed up. She says that she was also told that she needed to start wearing glasses however she never did. She says that lots of reading and close work makes the pain worse. she says today she came in because he pain was worse than normal but not worse than she has ever had. She says the pain is BL in the temporal region, no pain with EOM, no acute vision changes, no vomiting, fever, chest pain. No FB sensation in the eyes, pt did not take any medications and says that she does not want any now.

## 2019-12-18 ENCOUNTER — NON-APPOINTMENT (OUTPATIENT)
Age: 44
End: 2019-12-18

## 2019-12-18 ENCOUNTER — APPOINTMENT (OUTPATIENT)
Dept: OPHTHALMOLOGY | Facility: CLINIC | Age: 44
End: 2019-12-18
Payer: COMMERCIAL

## 2019-12-18 PROCEDURE — 92133 CPTRZD OPH DX IMG PST SGM ON: CPT

## 2019-12-18 PROCEDURE — 92014 COMPRE OPH EXAM EST PT 1/>: CPT

## 2019-12-18 PROCEDURE — 92083 EXTENDED VISUAL FIELD XM: CPT

## 2019-12-27 ENCOUNTER — EMERGENCY (EMERGENCY)
Facility: HOSPITAL | Age: 44
LOS: 1 days | Discharge: HOME | End: 2019-12-27
Attending: EMERGENCY MEDICINE
Payer: COMMERCIAL

## 2019-12-27 VITALS
TEMPERATURE: 98 F | RESPIRATION RATE: 16 BRPM | SYSTOLIC BLOOD PRESSURE: 115 MMHG | OXYGEN SATURATION: 100 % | HEART RATE: 78 BPM | WEIGHT: 126.1 LBS | DIASTOLIC BLOOD PRESSURE: 80 MMHG | HEIGHT: 67 IN

## 2019-12-27 PROCEDURE — 12011 RPR F/E/E/N/L/M 2.5 CM/<: CPT

## 2019-12-27 PROCEDURE — 99283 EMERGENCY DEPT VISIT LOW MDM: CPT | Mod: 25

## 2019-12-27 RX ORDER — ACETAMINOPHEN 500 MG
650 TABLET ORAL ONCE
Refills: 0 | Status: COMPLETED | OUTPATIENT
Start: 2019-12-27 | End: 2019-12-27

## 2019-12-27 RX ADMIN — Medication 650 MILLIGRAM(S): at 21:53

## 2019-12-27 NOTE — ED PROVIDER NOTE - CARE PLAN
Principal Discharge DX:	Eyebrow laceration, right, initial encounter  Goal:	**ATTENDING ADDENDUM (Dr. Giovani Jj): Goals of care include resolution of emergent/urgent symptoms and concerns, and restoration to baseline level of homeostasis.  Secondary Diagnosis:	Meniere disease Principal Discharge DX:	Eyebrow laceration, right, initial encounter  Goal:	**ATTENDING ADDENDUM (Dr. Giovani Jj): Goals of care include resolution of emergent/urgent symptoms and concerns, and restoration to baseline level of homeostasis.  Secondary Diagnosis:	Meniere disease  Secondary Diagnosis:	Blunt head trauma, initial encounter

## 2019-12-27 NOTE — ED PROVIDER NOTE - MUSCULOSKELETAL NECK EXAM
**ATTENDING ADDENDUM (Dr. Giovani Jj): NO tenderness with axial loading. NO cervical-thoracic-lumbar-sacral midline bony tenderness or stepoff./no deformity, pain or tenderness. no restriction of movement/supple/trachea midline

## 2019-12-27 NOTE — ED PROVIDER NOTE - OBJECTIVE STATEMENT
45 y/o F w/ PMH of meniere disease, gilbert's disease presenting w/ complaint of fall w/ facial injury. Pt presents with family. Reports earlier this evening walking the parking lot when she got dizzy and tripped. Reports the fall as mechanical. States the dizziness was consistent w/ her usual symptoms she experiences from the meniere's disease. Struck the R side of her face along the ground. Suffered abrasion to R side of chin and laceration to R brow. Lacerations to hands b/l. Denies LOC. Denies blood thinner use. Denies pain in the hands/wrists/arms. Unknown last tetanus. Has been ambulatory after the fall. Complaining only of mild headache. Denies fevers, chills, dizziness, blurred vision, chest pain, cough, shortness of breath, abdominal pain, n/v/d/c, urinary symptoms, MSK pain, rash. 43 y/o F w/ PMH of meniere disease, gilbert's disease presenting w/ complaint of fall w/ facial injury. Pt presents with family. Reports earlier this evening walking the parking lot when she got dizzy and tripped. Reports the fall as mechanical. States the dizziness was consistent w/ her usual symptoms she experiences from the meniere's disease. Struck the R side of her face along the ground. Suffered abrasion to R side of chin and laceration to R brow. Lacerations to hands b/l. Denies LOC. Denies blood thinner use. Denies pain in the hands/wrists/arms. Unknown last tetanus. Has been ambulatory after the fall. Complaining only of mild headache. Denies fevers, chills, dizziness, blurred vision, chest pain, cough, shortness of breath, abdominal pain, n/v/d/c, urinary symptoms, MSK pain, rash.  **ATTENDING ADDENDUM (Dr. Giovani Jj): I attest that I have directly and personally interviewed and examined this patient and elicited a comparable history of present illness and review of systems as documented, along with my EM resident. I attest that I have made significant contributions to the documentation where necessary and as noted in the EMR.

## 2019-12-27 NOTE — ED PROVIDER NOTE - ENMT, MLM
Airway patent, Nasal mucosa clear. Mouth with normal mucosa. Throat has no vesicles, no oropharyngeal exudates and uvula is midline. **ATTENDING ADDENDUM (Dr. Giovani Jj): AIRWAY CLEAR. NO pooling of secretions, POSITIVE gag reflex, NO debris, ABLE TO SELF-PROTECT AIRWAY. POSITIVE full range of motion of the mandible. NO temporomandibular joint tenderness with range of motion or palpation. NO dental trauma. NO malocclusion.

## 2019-12-27 NOTE — ED PROVIDER NOTE - CHIEF COMPLAINT
The patient is a 44y Female complaining of fall. The patient is a 44y Female complaining multiple abrasions to hand and face and eyebrow laceration s/p blunt head trauma s/p fall.

## 2019-12-27 NOTE — ED PROVIDER NOTE - NEUROLOGICAL, MLM
Alert and oriented, no focal deficits, no motor or sensory deficits. **ATTENDING ADDENDUM (Dr. Giovani Jj): Burdette coma score = 15 (eyes =4, verbal =5, motor =6). NO posturing. NO focal motor weakness. NO sensory changes. Awake, alert, coherent, interactive, and oriented to person, place, and time.

## 2019-12-27 NOTE — ED PROVIDER NOTE - GASTROINTESTINAL, MLM
Abdomen soft, non-tender **ATTENDING ADDENDUM (Dr. Giovani Jj): non-distended. NO guarding, rebound, or rigidity. NO pulsatile or non-pulsatile masses. NO hernias. NO obvious hepatosplenomegaly.

## 2019-12-27 NOTE — ED PROVIDER NOTE - RAPID ASSESSMENT
Richie Lazo rapid assessment documentation for Reji Phipps (MD):    44 year old female with pmhx Gilbert's disease, HA, Meniere disease, vertigo presents to the ED c/o facial injury s/p fall. Pt was at her daughter's track meet and suddenly felt dizzy then fell down a hill. Denies LOC.

## 2019-12-27 NOTE — ED PROVIDER NOTE - PLAN OF CARE
**ATTENDING ADDENDUM (Dr. Giovani Jj): Goals of care include resolution of emergent/urgent symptoms and concerns, and restoration to baseline level of homeostasis.

## 2019-12-27 NOTE — ED PROVIDER NOTE - AGGRAVATING FACTORS
palpation/**ATTENDING ADDENDUM (Dr. Giovani Jj): NO movement-associated, pleuritic, positional, or exertional component to the symptoms.

## 2019-12-27 NOTE — ED PROVIDER NOTE - NS ED ROS FT
GENERAL: No fever or chills  EYES: No change in vision  HEENT: No trouble swallowing or speaking. +laceration to R brow, abrasion to R side of chin  CARDIAC: No chest pain  PULMONARY: No cough or SOB  GI: No abdominal pain, no nausea or no vomiting, no diarrhea or constipation  : No changes in urination  SKIN: No rashes  NEURO: +headache, no numbness  MSK: No joint pain. +abrasions to hands b/l  Otherwise as HPI or negative. GENERAL: No fever or chills  EYES: No change in vision  HEENT: No trouble swallowing or speaking. +laceration to R brow, abrasion to R side of chin  CARDIAC: No chest pain  PULMONARY: No cough or SOB  GI: No abdominal pain, no nausea or no vomiting, no diarrhea or constipation  : No changes in urination  SKIN: No rashes  NEURO: +headache, no numbness  MSK: No joint pain. +abrasions to hands b/l  Otherwise as HPI or negative.  **ATTENDING ADDENDUM (Dr. Giovani Jj): During my interview with the patient, I have personally obtained and/or have directly verified the elements in the past medical/surgical history and other histories as noted earlier in the EMR,  in conjunction with the other members (EM resident/PA/NP) of the patient care team. I have also personally obtained and/or have directly verified/reviewed the review of systems as documented below, in conjunction with the other members (EM resident/PA/NP) of the patient care team.

## 2019-12-27 NOTE — ED PROVIDER NOTE - CLINICAL SUMMARY MEDICAL DECISION MAKING FREE TEXT BOX
**ATTENDING MEDICAL DECISION MAKING/SYNTHESIS (Dr. Giovani Jj): I have reviewed the Chief Complaint, the HPI, the ROS, and have directly performed and confirmed the findings on the Physical Examination. I have reviewed the medical decision making with all providers, as applicable. The PROBLEM REPRESENTATION at this time is: 44-year-old woman with history of Meniere's disease now presenting s/p mechanical fall and blunt head trauma with laceration slightly superior to the right eyebrow and right facial abrasion on chin/jaw line. The MOST LIKELY DIAGNOSIS, and the LIST OF DIFFERENTIAL DIAGNOSES, includes (but is not limited to) the following: cord/spine injury (considered but unlikely given presentation and clinical findings), intracerebral hemorrhage (NO evidence), intra-thoracic hemorrhage (hemothorax), rib fracture(s) or flail chest, intra-abdominal hemorrhage (hemoperitoneum), pelvis or other extremity injury, pneumothorax, hemoperitoneum (NO evidence of any of the latter conditions), concussion (possible), contusions (likely, mild), sprain/strain (possible), abrasion and laceration (as described, obvious). The likelihood of each of these diagnoses has been appropriately considered in the context of this patient's presentation and my evaluation. PLAN: as described in EMR, including diagnostics, therapeutics and consultation as clinically warranted. I will continue to reevaluate the patient, including the results of all testing, and monitor response to therapy throughout the patient's course in the ED.

## 2019-12-27 NOTE — ED PROVIDER NOTE - RESPIRATORY, MLM
Breath sounds clear and equal bilaterally. **ATTENDING ADDENDUM (Dr. Giovani Jj): AIRWAY CLEAR. NO pooling of secretions, POSITIVE gag reflex, NO debris, ABLE TO SELF-PROTECT AIRWAY. POSITIVE full range of motion of the mandible. NO temporomandibular joint tenderness with range of motion or palpation. NO dental trauma. NO malocclusion.

## 2019-12-27 NOTE — ED PROVIDER NOTE - LOCATION
chin/**ATTENDING ADDENDUM (Dr. Giovani Jj): abrasion to chin **ATTENDING ADDENDUM (Dr. Giovani Jj): laceration to right eyebrow/face hand/**ATTENDING ADDENDUM (Dr. Giovani Jj): abrasions to hands

## 2019-12-27 NOTE — ED PROVIDER NOTE - CHPI ED SYMPTOMS POS
ABRASION/**ATTENDING ADDENDUM (Dr. Giovani Jj): POSITIVE mild headache; POSITIVE dizziness (consistent with prior episodes and symptoms related to Meniere's disease)/STIFFNESS/LACERATION

## 2019-12-27 NOTE — ED PROVIDER NOTE - CPE EDP CARDIAC NORM
Pt presents to office today for colonoscopy consult. Pt complains of dizziness while in triage. Pt O2 and BP is within normal limits. Spoke to Dr Modesta Howard who states pt needs to see his primary care today.  MA called and spoke to Dr Lore Castanon office who said Dr Justin Haynes will see patient this afternoon @ 1:15  Electronically signed by Erika Hendrix MA on 8/28/18 at 8:41 AM normal...

## 2019-12-27 NOTE — ED PROVIDER NOTE - ATTENDING CONTRIBUTION TO CARE
**ATTENDING ADDENDUM (Dr. Giovani Jj): I attest that I have directly examined this patient and reviewed and formulated the diagnostic and therapeutic management plan in collaboration with the EM resident. Please see MDM note and remainder of EMR for findings from CC, HPI, ROS, and PE. (Rebao)

## 2019-12-27 NOTE — ED PROVIDER NOTE - PATIENT PORTAL LINK FT
You can access the FollowMyHealth Patient Portal offered by [facility] by registering at the following website: [email or web address]. By joining [employer]’s FollowMyHealth portal, you will also be able to view your health information using other applications (apps) compatible with our system.

## 2019-12-27 NOTE — ED PROVIDER NOTE - PHYSICAL EXAMINATION
Gen: NAD, AOx3, able to make needs known, non-toxic  Head: 2 cm laceration w/ edema R lateral brow, mild abrasion w/ contusion and associated edema to R side of chin  HEENT: EOMI, oral mucosa moist, normal conjunctiva  Lung: CTAB, no respiratory distress, no wheezes/rhonchi/rales B/L, speaking in full sentences  CV: RRR, no murmurs  Abd: soft, NTND, no guarding  MSK: no visible deformities. No tenderness on palpation of hands/wrist b/l. FROM hands/wrists b/l. No snuff box tenderness b/l. No pain w/ axial load or distraction b/l. Scattered abrasions on dorsum of hands b/l  Neuro: No focal sensory or motor deficits  Skin: Warm, well perfused  Psych: normal affect Gen: NAD, AOx3, able to make needs known, non-toxic  Head: 2 cm laceration w/ edema R lateral brow, mild abrasion w/ contusion and associated edema to R side of chin  HEENT: EOMI, oral mucosa moist, normal conjunctiva  Lung: CTAB, no respiratory distress, no wheezes/rhonchi/rales B/L, speaking in full sentences  CV: RRR, no murmurs  Abd: soft, NTND, no guarding  MSK: no visible deformities. No tenderness on palpation of hands/wrist b/l. FROM hands/wrists b/l. No snuff box tenderness b/l. No pain w/ axial load or distraction b/l. Scattered abrasions on dorsum of hands b/l  Neuro: No focal sensory or motor deficits  Skin: Warm, well perfused  Psych: normal affect  **ATTENDING ADDENDUM (Dr. Giovani Jj): I have reviewed and substantially contributed to the elements of the PE as documented above. I have directly performed an examination of this patient in conjunction with the other members (EM resident/PA/NP) of the patient care team.

## 2019-12-27 NOTE — ED PROVIDER NOTE - CHPI ED SYMPTOMS NEG
no vomiting/no deformity/no weakness/no loss of consciousness/no numbness/no tingling/no confusion/**ATTENDING ADDENDUM (Dr. Giovani Jj): NO focal or generalized weakness. NO numbness, tingling, weakness, or paresthesias.

## 2019-12-27 NOTE — ED PROVIDER NOTE - EYES, MLM
Clear bilaterally, pupils equal, round and reactive to light. **ATTENDING ADDENDUM (Dr. Giovani Jj): Extraocular muscle movements intact. Clear corneas bilaterally, pupils equal and round. NO nystagmus. POSITIVE right eyebrow laceration, approximately 2 cm, linear, without exposed bone or tendons, and without foreign body.

## 2019-12-27 NOTE — ED PROVIDER NOTE - FAMILY DETAILS FREE TEXT FOR MDM ADDL HISTORY OBTAINED FROM QUESTION
**ATTENDING ADDENDUM (Dr. Giovani Jj): family member(s) present during patient's ED visit; corroborated CC, HPI and review of systems as provided by patient.

## 2019-12-27 NOTE — ED PROVIDER NOTE - EXTREMITY EXAM
**ATTENDING ADDENDUM (Dr. Giovani Jj): POSITIVE abrasions on the bilateral hands. NO restriction in range of motion of the joints of the upper and lower extremities.

## 2019-12-27 NOTE — ED PROVIDER NOTE - PROGRESS NOTE DETAILS
**ATTENDING ADDENDUM (Dr. Giovani Jj): patient serially evaluated throughout ED course. NO acute deterioration up to this time in the ED. Awaiting therapeutics as ordered and primary closure of right eyebrow wound (2 cm laceration). Will continue to observe and monitor closely. NO evidence of intracerebral hemorrhage or cord/spine injury at this time. NO evidence of skull or facial fracture at this time. NO evidence of EOM entrapment. Anticipatory guidance provided. Dr. Ashok Worthington, PGY-2: wound repaired. Return precautions provided, pt verbalized understanding. Pt to have sutures removed in 5 days. Ready for DC. Dr. Ashok Worthington, PGY-2: wound repaired. Return precautions provided, pt verbalized understanding. Pt to have sutures removed in 5 days. Ready for DC.  **ATTENDING ADDENDUM (Dr. Giovani Jj): patient serially evaluated throughout ED course. NO acute deterioration up to this time in the ED. Agree with above notation by EM resident Ander. NO evidence of acute cord/spine injury, acute traumatic injuries/emergencies, or other worrisome sequela (e.g., intracerebral hemorrhage, etc.) or worrisome etiologies (e.g. syncope, arrhythmia, mass/tumor of CNS, etc.) at this time. Agree with discharge home with close outpatient followup with primary care physician/provider.

## 2019-12-27 NOTE — ED PROVIDER NOTE - NSFOLLOWUPINSTRUCTIONS_ED_ALL_ED_FT
Laceration    A laceration is a cut that goes through all of the layers of the skin and into the tissue that is right under the skin. Some lacerations heal on their own. Others need to be closed with skin adhesive strips, skin glue, stitches (sutures), or staples. Proper laceration care minimizes the risk of infection and helps the laceration to heal better.  If non-absorbable stitches or staples have been placed, they must be taken out within the time frame instructed by your healthcare provider.    SEEK IMMEDIATE MEDICAL CARE IF YOU HAVE ANY OF THE FOLLOWING SYMPTOMS: swelling around the wound, worsening pain, drainage from the wound, red streaking going away from your wound, inability to move finger or toe near the laceration, or discoloration of skin near the laceration.     1) Follow up with your doctor in 5 days (or return to ED) for suture removal  2) Return to the ED immediately for new or worsening symptoms   3) Please continue to take any home medications as prescribed  4) Please take Tylenol 650 mg every 4 hours as needed for pain. Please do not exceed more than 4,000mg of Tylenol in a day

## 2019-12-28 PROCEDURE — 99283 EMERGENCY DEPT VISIT LOW MDM: CPT | Mod: 25

## 2019-12-28 PROCEDURE — 90471 IMMUNIZATION ADMIN: CPT

## 2019-12-28 PROCEDURE — 12011 RPR F/E/E/N/L/M 2.5 CM/<: CPT

## 2019-12-28 PROCEDURE — 90715 TDAP VACCINE 7 YRS/> IM: CPT

## 2019-12-28 RX ORDER — TETANUS TOXOID, REDUCED DIPHTHERIA TOXOID AND ACELLULAR PERTUSSIS VACCINE, ADSORBED 5; 2.5; 8; 8; 2.5 [IU]/.5ML; [IU]/.5ML; UG/.5ML; UG/.5ML; UG/.5ML
0.5 SUSPENSION INTRAMUSCULAR ONCE
Refills: 0 | Status: COMPLETED | OUTPATIENT
Start: 2019-12-28 | End: 2019-12-28

## 2019-12-28 RX ADMIN — TETANUS TOXOID, REDUCED DIPHTHERIA TOXOID AND ACELLULAR PERTUSSIS VACCINE, ADSORBED 0.5 MILLILITER(S): 5; 2.5; 8; 8; 2.5 SUSPENSION INTRAMUSCULAR at 04:29

## 2019-12-28 NOTE — ED ADULT NURSE NOTE - OBJECTIVE STATEMENT
Patient is a 44y female presenting to the ED ambulatory from home s/p fall. PMH of Meniere's disease. Dizziness for the past "couple" of days. Was at her daughter's track meet, report feeling "very dizzy" and tried walking to the car, tripped and fell forward. Reports using her hands to break her fall but also hitting the right side of her head and face on the ground. Patient is a 44y female presenting to the ED ambulatory from home s/p fall. PMH of Meniere's disease. Dizziness for the past "couple" of days. Was at her daughter's track meet, report feeling "very dizzy" and tried walking to the car, tripped and fell forward. Reports using her hands to break her fall but also hitting the right side of her head and face on the ground. Patient reports getting up right away from the ground on her own strength. Denies LOC. Patient has laceration above the right eyebrow. Abrasions noted to the right side of the face, jaw, and on bilateral anterior hands. Patient denies dizziness, blurred vision, headache, numbness/tingling, N/V, fever/chills currently. Heart sounds heard upon auscultation. Lung sounds clear bilaterally. Abdomen soft, non-distended and non-tender upon palpation. Plan of care discussed. Safety and comfort measures maintained. Will continue to monitor.

## 2019-12-28 NOTE — ED PROCEDURE NOTE - CPROC ED TOLERANCE1
Patient tolerated procedure well. Patient tolerated procedure well./**ATTENDING ADDENDUM (Dr. Giovani Jj): Anticipatory guidance provided.

## 2019-12-28 NOTE — ED PROCEDURE NOTE - PROCEDURE ADDITIONAL DETAILS
**ATTENDING ADDENDUM (Dr. Giovani Jj): I was present during the key portions of the procedure and immediately available during the entire procedure. Agree with plan and management. Anticipatory guidance provided.

## 2020-01-08 ENCOUNTER — NON-APPOINTMENT (OUTPATIENT)
Age: 45
End: 2020-01-08

## 2020-01-08 ENCOUNTER — APPOINTMENT (OUTPATIENT)
Dept: OPHTHALMOLOGY | Facility: CLINIC | Age: 45
End: 2020-01-08
Payer: COMMERCIAL

## 2020-01-08 PROCEDURE — 92083 EXTENDED VISUAL FIELD XM: CPT

## 2020-01-08 PROCEDURE — 92012 INTRM OPH EXAM EST PATIENT: CPT

## 2020-01-23 ENCOUNTER — APPOINTMENT (OUTPATIENT)
Dept: OTOLARYNGOLOGY | Facility: CLINIC | Age: 45
End: 2020-01-23

## 2020-01-30 ENCOUNTER — APPOINTMENT (OUTPATIENT)
Dept: NEUROLOGY | Facility: CLINIC | Age: 45
End: 2020-01-30
Payer: COMMERCIAL

## 2020-01-30 VITALS
HEIGHT: 67 IN | SYSTOLIC BLOOD PRESSURE: 118 MMHG | DIASTOLIC BLOOD PRESSURE: 76 MMHG | HEART RATE: 85 BPM | WEIGHT: 120 LBS | BODY MASS INDEX: 18.83 KG/M2

## 2020-01-30 DIAGNOSIS — G44.009 CLUSTER HEADACHE SYNDROME, UNSPECIFIED, NOT INTRACTABLE: ICD-10-CM

## 2020-01-30 PROCEDURE — 99214 OFFICE O/P EST MOD 30 MIN: CPT

## 2020-01-30 NOTE — REVIEW OF SYSTEMS
[Fever] : no fever [Chills] : no chills [Feeling Tired] : feeling tired [As Noted in HPI] : as noted in HPI [Dizziness] : no dizziness [Migraine Headache] : migraine headaches [Eye Pain] : eye pain [Tension Headache] : tension-type headaches [Negative] : Heme/Lymph [FreeTextEntry3] : right side

## 2020-01-30 NOTE — HISTORY OF PRESENT ILLNESS
[FreeTextEntry1] : Patient reports that since last visit she has been having her headaches about 3-4 times per week and sometimes she will have right sided headache and other times right sided severe eye pain. \par She will also get nausea and vomiting and light and sound sensitivity. \par She did not start the verapamil 40mg that was recommended last visit.\par For abortive treatment she has been using tylenol and NSIADS as needed and zofran as for nausea which does help her a great deal. Her biggest triggers are stress from work, home and being on the computer for too long. She has a mild headache and eye pain today but tolerable. \par

## 2020-01-30 NOTE — PHYSICAL EXAM
[General Appearance - Alert] : alert [Oriented To Time, Place, And Person] : oriented to person, place, and time [Place] : oriented to place [Person] : oriented to person [Time] : oriented to time [Cranial Nerves Optic (II)] : visual acuity intact bilaterally,  visual fields full to confrontation, pupils equal round and reactive to light [Cranial Nerves Oculomotor (III)] : extraocular motion intact [Cranial Nerves Trigeminal (V)] : facial sensation intact symmetrically [Cranial Nerves Glossopharyngeal (IX)] : tongue and palate midline [Cranial Nerves Vestibulocochlear (VIII)] : hearing was intact bilaterally [Cranial Nerves Facial (VII)] : face symmetrical [Cranial Nerves Accessory (XI - Cranial And Spinal)] : head turning and shoulder shrug symmetric [Motor Tone] : muscle tone was normal in all four extremities [Cranial Nerves Hypoglossal (XII)] : there was no tongue deviation with protrusion [Motor Strength] : muscle strength was normal in all four extremities [Involuntary Movements] : no involuntary movements were seen [Motor Handedness Right-Handed] : the patient is right hand dominant [No Muscle Atrophy] : normal bulk in all four extremities [Paresis Pronator Drift Right-Sided] : no pronator drift on the right [Paresis Pronator Drift Left-Sided] : no pronator drift on the left [Motor Strength Upper Extremities Bilaterally] : strength was normal in both upper extremities [Motor Strength Lower Extremities Bilaterally] : strength was normal in both lower extremities [Sensation Pain / Temperature Decrease] : pain and temperature was intact [Abnormal Walk] : normal gait [Romberg's Sign] : Romberg's sign was negtive [Sensation Tactile Decrease] : light touch was intact [Balance] : balance was intact [Limited Balance] : balance was intact [Past-pointing] : there was no past-pointing [Tremor] : no tremor present [Dysdiadochokinesia Bilaterally] : not present [Coordination - Dysmetria Impaired Finger-to-Nose Bilateral] : not present [Coordination - Dysmetria Impaired Heel-to-Shin Bilateral] : not present [2+] : Ankle jerk right 2+ [Plantar Reflex Left Only] : normal on the left [Plantar Reflex Right Only] : normal on the right [FreeTextEntry8] : Patient able to tandem gait today well [Extraocular Movements] : extraocular movements were intact [Full Visual Field] : full visual field [Neck Appearance] : the appearance of the neck was normal [] : no respiratory distress

## 2020-01-30 NOTE — DISCUSSION/SUMMARY
[FreeTextEntry1] : 1. Cluster headache /mixed headache syndrome: will start verapamil 40mg daily and melatonin 5 mg at night to assist in regulating the sleep wake cycle.\par \par 2. Headache diary and for abortive continue NSAIDS, zofran and relpax as needed\par \par 3. Follow up in 2-3 months \par \par 4. All questions answered.

## 2020-02-13 ENCOUNTER — RESULT REVIEW (OUTPATIENT)
Age: 45
End: 2020-02-13

## 2020-04-22 ENCOUNTER — APPOINTMENT (OUTPATIENT)
Dept: NEUROLOGY | Facility: CLINIC | Age: 45
End: 2020-04-22
Payer: COMMERCIAL

## 2020-04-22 DIAGNOSIS — M62.838 OTHER MUSCLE SPASM: ICD-10-CM

## 2020-04-22 PROCEDURE — 99214 OFFICE O/P EST MOD 30 MIN: CPT | Mod: 95

## 2020-04-22 RX ORDER — RIZATRIPTAN BENZOATE 10 MG/1
10 TABLET, ORALLY DISINTEGRATING ORAL
Qty: 6 | Refills: 1 | Status: ACTIVE | COMMUNITY
Start: 2019-10-29 | End: 1900-01-01

## 2020-04-22 NOTE — HISTORY OF PRESENT ILLNESS
[Home] : at home, [unfilled] , at the time of the visit. [Medical Office: ([facility])___] : at the medical office located in  [Self] : self [FreeTextEntry2] : John Parra [Patient] : the patient [FreeTextEntry1] : Patient reports that since last visit she has overall been doing better with her headaches. She has been taking the verapamil 40mg daily and it has been helpful. She has been getting on average about 3-4 headaches per week but not migraine/cluster types but more the aching left sided pressure type headaches. Along with muscle spasms on the left side of her neck\par Her migraine/cluster type headaches with the throbbing pains and nausea, vomiting and light and sound sensitivity occur about 2-3 per month. Those are triggered by being on computer for too long and skipping meals and poor sleep etc..\par She will use for abortive rizatriptan and zofran and tylenol with good response. \par She has been taking the vitamin regimen of magnesium and coQ 10 regularly and using warm paks and keeping a headache diary.

## 2020-04-22 NOTE — DISCUSSION/SUMMARY
[FreeTextEntry1] : 1. CLuster/ migraine variant headaches: will increase the verapamil to 40mg twice daily for prevention and for abortive continue zofran and triptan as needed and hydration and rest\par \par 2. Left sided neck and head pains and muscle spasms: continue heat application and magnesium and coQ 10 \par \par 3. Continue headache diary and follow up in 3-4 months ans all questions answered.

## 2020-04-22 NOTE — REVIEW OF SYSTEMS
[Fever] : no fever [Feeling Tired] : not feeling tired [Abnormal Sensation] : no abnormal sensation [Chills] : no chills [As Noted in HPI] : as noted in HPI [Lightheadedness] : no lightheadedness [Dizziness] : no dizziness [Hypersensitivity] : hypersensitivity [Tension Headache] : tension-type headaches [Sleep Disturbances] : no sleep disturbances [Anxiety] : no anxiety [Negative] : Heme/Lymph [FreeTextEntry9] : left sided neck and muscle tension

## 2020-04-22 NOTE — PHYSICAL EXAM
[General Appearance - Alert] : alert [Person] : oriented to person [Oriented To Time, Place, And Person] : oriented to person, place, and time [Cranial Nerves Optic (II)] : visual acuity intact bilaterally,  visual fields full to confrontation, pupils equal round and reactive to light [Place] : oriented to place [Time] : oriented to time [Cranial Nerves Oculomotor (III)] : extraocular motion intact [Cranial Nerves Trigeminal (V)] : facial sensation intact symmetrically [Cranial Nerves Facial (VII)] : face symmetrical [Cranial Nerves Glossopharyngeal (IX)] : tongue and palate midline [Cranial Nerves Accessory (XI - Cranial And Spinal)] : head turning and shoulder shrug symmetric [Cranial Nerves Hypoglossal (XII)] : there was no tongue deviation with protrusion [Involuntary Movements] : no involuntary movements were seen [No Muscle Atrophy] : normal bulk in all four extremities [Paresis Pronator Drift Right-Sided] : no pronator drift on the right [Paresis Pronator Drift Left-Sided] : no pronator drift on the left [Motor Handedness Right-Handed] : the patient is right hand dominant [Motor Strength Upper Extremities Bilaterally] : strength was normal in both upper extremities [Motor Strength Lower Extremities Bilaterally] : strength was normal in both lower extremities [Balance] : balance was intact [Romberg's Sign] : Romberg's sign was negtive [Limited Balance] : balance was intact [Dysdiadochokinesia Bilaterally] : not present [Past-pointing] : there was no past-pointing [Tremor] : no tremor present [Coordination - Dysmetria Impaired Finger-to-Nose Bilateral] : not present [Plantar Reflex Right Only] : normal on the right [Extraocular Movements] : extraocular movements were intact [Plantar Reflex Left Only] : normal on the left [FreeTextEntry1] : limited neck mobility to the left with rotation and sidebending  [Heart Sounds] : normal S1 and S2 [] : no rash [Skin Lesions] : no skin lesions

## 2020-10-28 ENCOUNTER — APPOINTMENT (OUTPATIENT)
Dept: OTOLARYNGOLOGY | Facility: CLINIC | Age: 45
End: 2020-10-28
Payer: COMMERCIAL

## 2020-10-28 PROCEDURE — 99204 OFFICE O/P NEW MOD 45 MIN: CPT | Mod: 25

## 2020-10-28 PROCEDURE — 99072 ADDL SUPL MATRL&STAF TM PHE: CPT

## 2020-10-28 PROCEDURE — 92567 TYMPANOMETRY: CPT

## 2020-10-28 PROCEDURE — 92557 COMPREHENSIVE HEARING TEST: CPT

## 2020-10-28 NOTE — ASSESSMENT
[FreeTextEntry1] : Right sided Menieres Disease vs Vestibular Migraines \par \par -low salt diet advised\par Rx-Betahistine\par and advised acupuncture \par consider IT steroids\par poss vestib testing and MRI\par \par \par f/u 6 weeks

## 2020-10-28 NOTE — DATA REVIEWED
[de-identified] : right low freq SNHL\par Hearing Test performed to evaluate the extent of hearing loss and  to explain pt's symptoms\par

## 2020-10-28 NOTE — HISTORY OF PRESENT ILLNESS
[Tinnitus] : tinnitus [Vertigo] : vertigo [Dizziness] : dizziness [Hearing Loss] : hearing loss [No] : patient does not have a  history of radiation therapy [de-identified] : 44 yo female\par Patient with hx of right sided Menieres Disease with tinnitus hearing loss,vertigo and Migraines . States over the past year her symptoms have worsened. Very sensitive to loud sounds, hearing has worsened as well. Has tried Antivert with mild relief, tries not to take often because it makes her sleepy. \par no other modifying factors\par \par  [Anxiety] : no anxiety [Headache] : no headache [Recurrent Otitis Media] : no recurrent otitis media [Otitis Media with Effusion] : no otitis media with effusion [Presbycusis] : no presbycusis [Congenital Ear Malformation] : no congenital ear malformation [Meniere Disease] : no Meniere disease [Otosclerosis] : no otosclerosis [Perilymphatic Fistula] : no perilymphatic fistula [Hypertension] : no hypertension [Loud Noise Exposure] : no history of loud noise exposure [Smoking] : no smoking [Early Onset Hearing Loss] : no early onset hearing loss [Stroke] : no stroke [Facial Pain] : no facial pain [Facial Pressure] : no facial pressure [Nasal Congestion] : no nasal congestion [Diplopia] : no diplopia [Ear Fullness] : no ear fullness [Allergic Rhinitis] : no allergic rhinitis [Maxillary Tooth Infection] : no maxillary tooth infection [Septal Deviation] : no septal deviation [Environmental Allergies] : no environmental allergies [Environmental Allergens] : no environmental allergens [Adenoidectomy] : no adenoidectomy [Nasal FB Removal] : no nasal foreign body removal [Allergies] : no allergies [Asthma] : no asthma [Neck Mass] : no neck mass [Neck Pain] : no neck pain [Chills] : no chills [Cold Intolerance] : no cold intolerance [Cough] : no cough [Fatigue] : no fatigue [Heat Intolerance] : no heat intolerance [Hyperthyroidism] : no hyperthyroidism [Sialadenitis] : no sialadenitis [Hodgkin Disease] : no hodgkin disease [Non-Hodgkin Lymphoma] : no non-hodgkin lymphoma [Tobacco Use] : no tobacco use [Alcohol Use] : no alcohol use [Graves Disease] : no graves disease [Thyroid Cancer] : no thyroid cancer

## 2020-10-28 NOTE — PHYSICAL EXAM
[Fukuda Step Test] : Fukuda Step Test was Positive [Midline] : trachea located in midline position [Normal] : no rashes [Nystagmus] : ~T no ~M nystagmus was seen [Mauricio-Halllocoke] : [place]-Hallpike: Negative [de-identified] : weak step test

## 2020-12-10 ENCOUNTER — APPOINTMENT (OUTPATIENT)
Dept: OTOLARYNGOLOGY | Facility: CLINIC | Age: 45
End: 2020-12-10

## 2021-04-21 ENCOUNTER — APPOINTMENT (OUTPATIENT)
Dept: NEUROLOGY | Facility: CLINIC | Age: 46
End: 2021-04-21
Payer: COMMERCIAL

## 2021-04-21 VITALS
WEIGHT: 124 LBS | DIASTOLIC BLOOD PRESSURE: 79 MMHG | HEART RATE: 79 BPM | SYSTOLIC BLOOD PRESSURE: 124 MMHG | HEIGHT: 67 IN | BODY MASS INDEX: 19.46 KG/M2

## 2021-04-21 VITALS — TEMPERATURE: 97.1 F

## 2021-04-21 DIAGNOSIS — G43.909 MIGRAINE, UNSPECIFIED, NOT INTRACTABLE, W/OUT STATUS MIGRAINOSUS: ICD-10-CM

## 2021-04-21 DIAGNOSIS — R11.0 NAUSEA: ICD-10-CM

## 2021-04-21 PROCEDURE — 99072 ADDL SUPL MATRL&STAF TM PHE: CPT

## 2021-04-21 PROCEDURE — 99214 OFFICE O/P EST MOD 30 MIN: CPT

## 2021-04-21 RX ORDER — VENLAFAXINE HYDROCHLORIDE 37.5 MG/1
37.5 CAPSULE, EXTENDED RELEASE ORAL
Qty: 30 | Refills: 0 | Status: DISCONTINUED | COMMUNITY
Start: 2016-10-11 | End: 2021-04-21

## 2021-04-21 RX ORDER — ONDANSETRON 4 MG/1
4 TABLET ORAL
Qty: 90 | Refills: 1 | Status: ACTIVE | COMMUNITY
Start: 2019-10-29 | End: 1900-01-01

## 2021-04-21 RX ORDER — NORTRIPTYLINE HYDROCHLORIDE 10 MG/1
10 CAPSULE ORAL
Qty: 90 | Refills: 1 | Status: ACTIVE | COMMUNITY
Start: 2021-04-21 | End: 1900-01-01

## 2021-04-21 NOTE — REVIEW OF SYSTEMS
[Fever] : no fever [Chills] : no chills [Feeling Tired] : feeling tired [As Noted in HPI] : as noted in HPI [Abnormal Sensation] : an abnormal sensation [Dizziness] : no dizziness [Lightheadedness] : no lightheadedness [Migraine Headache] : migraine headaches [Tension Headache] : tension-type headaches [Difficulty Walking] : no difficulty walking [Negative] : Heme/Lymph

## 2021-04-21 NOTE — PHYSICAL EXAM
[General Appearance - Alert] : alert [Oriented To Time, Place, And Person] : oriented to person, place, and time [Person] : oriented to person [Place] : oriented to place [Time] : oriented to time [Cranial Nerves Optic (II)] : visual acuity intact bilaterally,  visual fields full to confrontation, pupils equal round and reactive to light [Cranial Nerves Oculomotor (III)] : extraocular motion intact [Cranial Nerves Trigeminal (V)] : facial sensation intact symmetrically [Cranial Nerves Facial (VII)] : face symmetrical [Cranial Nerves Vestibulocochlear (VIII)] : hearing was intact bilaterally [Cranial Nerves Glossopharyngeal (IX)] : tongue and palate midline [Cranial Nerves Accessory (XI - Cranial And Spinal)] : head turning and shoulder shrug symmetric [Cranial Nerves Hypoglossal (XII)] : there was no tongue deviation with protrusion [Motor Strength] : muscle strength was normal in all four extremities [Involuntary Movements] : no involuntary movements were seen [No Muscle Atrophy] : normal bulk in all four extremities [Motor Handedness Right-Handed] : the patient is right hand dominant [Paresis Pronator Drift Right-Sided] : no pronator drift on the right [Paresis Pronator Drift Left-Sided] : no pronator drift on the left [Motor Strength Upper Extremities Bilaterally] : strength was normal in both upper extremities [Motor Strength Lower Extremities Bilaterally] : strength was normal in both lower extremities [Sensation Tactile Decrease] : light touch was intact [Romberg's Sign] : Romberg's sign was negtive [Balance] : balance was intact [Limited Balance] : balance was intact [Past-pointing] : there was no past-pointing [Tremor] : no tremor present [Dysdiadochokinesia Bilaterally] : not present [Coordination - Dysmetria Impaired Finger-to-Nose Bilateral] : not present [2+] : Ankle jerk left 2+ [Plantar Reflex Right Only] : normal on the right [Plantar Reflex Left Only] : normal on the left [FreeTextEntry8] : Difficulty with tandem gait  [Extraocular Movements] : extraocular movements were intact [Neck Appearance] : the appearance of the neck was normal [] : no rash [Skin Lesions] : no skin lesions

## 2021-04-21 NOTE — DISCUSSION/SUMMARY
[FreeTextEntry1] : 1. Chronic migraine/cluster type headaches and will need to restart a preventative such as pamelor 10mg at night and will use ubrevely for abortive with zofran \par \par 2. Continue headache and symptom diary and follow up in 3 months and all questions answered

## 2021-04-21 NOTE — HISTORY OF PRESENT ILLNESS
[FreeTextEntry1] : Patient reports that since last visit she has been having a flare up and exacerbation in her headaches and visual changes. She gets extreme light sensitivity and nausea but no vomiting. \par She had tried the verapamil 40mg BID and it initially worked but then was not as effective.\par \par Her current pain is a 6/10 and she has sharp pains on the top of her head and sides of temples with pressure. Along with pressure and throbbing behind her eyes.\par \par Currently she is not taking any of the headache supplements. She will use excedrin migraine as needed for abortive but it is not effective.

## 2021-05-03 ENCOUNTER — APPOINTMENT (OUTPATIENT)
Dept: OTOLARYNGOLOGY | Facility: CLINIC | Age: 46
End: 2021-05-03
Payer: COMMERCIAL

## 2021-05-03 VITALS
HEIGHT: 67 IN | WEIGHT: 123 LBS | SYSTOLIC BLOOD PRESSURE: 113 MMHG | DIASTOLIC BLOOD PRESSURE: 73 MMHG | BODY MASS INDEX: 19.3 KG/M2 | HEART RATE: 72 BPM

## 2021-05-03 DIAGNOSIS — G43.809 OTHER MIGRAINE, NOT INTRACTABLE, W/OUT STATUS MIGRAINOSUS: ICD-10-CM

## 2021-05-03 PROCEDURE — 99214 OFFICE O/P EST MOD 30 MIN: CPT

## 2021-05-03 PROCEDURE — 99072 ADDL SUPL MATRL&STAF TM PHE: CPT

## 2021-05-03 NOTE — ASSESSMENT
[FreeTextEntry1] : Patient with hx of Right sided Menieres Disease vs Vestibular Migraines presents for follow up \par \par Menieres Disease vs Vestibular Migraines \par -continue low salt diet \par -Migraine diet handout given \par -advised acupuncture \par -Possible low dose betahistine \par \par \par f/u 6 weeks or prn

## 2021-05-03 NOTE — DATA REVIEWED
[de-identified] : right low freq SNHL\par Hearing Test performed to evaluate the extent of hearing loss and  to explain pt's symptoms\par

## 2021-05-03 NOTE — END OF VISIT
[FreeTextEntry3] : I personally saw and examined VALERIA ROSARIO in detail. I spoke to JUANA Flores regarding the assessment and plan of care. I reviewed the above assessment and plan of care, and agree. I have made changes in changes in the body of the note where appropriate.I personally reviewed the HPI, PMH, FH, SH, ROS and medications/allergies. I have spoken to JUANA Flores regarding the history and have personally determined the assessment and plan of care, and documented this myself. I was present and participated in all key portions of the encounter and all procedures noted above. I have made changes in the body of the note where appropriate.\par \par Attesting Faculty: See Attending Signature Below \par \par \par  [Time Spent: ___ minutes] : I have spent [unfilled] minutes of time on the encounter.

## 2021-05-03 NOTE — PHYSICAL EXAM
[Midline] : trachea located in midline position [Normal] : gait was normal [Fukuda Step Test] : Fukuda Step Test was Positive [Nystagmus] : ~T no ~M nystagmus was seen [Mauricio-Halllocoke] : [place]-Hallpike: Negative [de-identified] : weak step test

## 2021-05-03 NOTE — HISTORY OF PRESENT ILLNESS
[No] : patient does not have a  history of radiation therapy [Tinnitus] : tinnitus [Vertigo] : vertigo [Dizziness] : dizziness [Hearing Loss] : hearing loss [de-identified] : 46 yo female\par Patient with hx of right sided Menieres Disease with tinnitus hearing loss,vertigo and Migraines . States over the past year her symptoms have worsened. Very sensitive to loud sounds, hearing has worsened as well. Has tried Antivert with mild relief, tries not to take often because it makes her sleepy. \par no other modifying factors\par \par  [FreeTextEntry1] : 5/3/2021\par Patient presents for follow up. States since her last visit there is no change in her symptoms. She continues to feel dizzy. Got 2 drops attacks, was taken to the ED for it. She feels imbalance, hard for her to drive. She tried taking the Betahistine, was making her very tired and sleepy.  [Anxiety] : no anxiety [Headache] : no headache [Recurrent Otitis Media] : no recurrent otitis media [Otitis Media with Effusion] : no otitis media with effusion [Presbycusis] : no presbycusis [Congenital Ear Malformation] : no congenital ear malformation [Meniere Disease] : no Meniere disease [Otosclerosis] : no otosclerosis [Perilymphatic Fistula] : no perilymphatic fistula [Hypertension] : no hypertension [Loud Noise Exposure] : no history of loud noise exposure [Smoking] : no smoking [Early Onset Hearing Loss] : no early onset hearing loss [Stroke] : no stroke [Facial Pain] : no facial pain [Facial Pressure] : no facial pressure [Nasal Congestion] : no nasal congestion [Diplopia] : no diplopia [Ear Fullness] : no ear fullness [Allergic Rhinitis] : no allergic rhinitis [Maxillary Tooth Infection] : no maxillary tooth infection [Septal Deviation] : no septal deviation [Environmental Allergies] : no environmental allergies [Environmental Allergens] : no environmental allergens [Adenoidectomy] : no adenoidectomy [Nasal FB Removal] : no nasal foreign body removal [Allergies] : no allergies [Asthma] : no asthma [Neck Mass] : no neck mass [Neck Pain] : no neck pain [Chills] : no chills [Cold Intolerance] : no cold intolerance [Cough] : no cough [Fatigue] : no fatigue [Heat Intolerance] : no heat intolerance [Hyperthyroidism] : no hyperthyroidism [Sialadenitis] : no sialadenitis [Hodgkin Disease] : no hodgkin disease [Non-Hodgkin Lymphoma] : no non-hodgkin lymphoma [Tobacco Use] : no tobacco use [Alcohol Use] : no alcohol use [Graves Disease] : no graves disease [Thyroid Cancer] : no thyroid cancer

## 2021-05-10 RX ORDER — UBROGEPANT 50 MG/1
50 TABLET ORAL
Qty: 6 | Refills: 1 | Status: ACTIVE | COMMUNITY
Start: 2021-04-21 | End: 1900-01-01

## 2021-05-11 ENCOUNTER — EMERGENCY (EMERGENCY)
Facility: HOSPITAL | Age: 46
LOS: 1 days | Discharge: HOME | End: 2021-05-11
Attending: EMERGENCY MEDICINE | Admitting: EMERGENCY MEDICINE
Payer: COMMERCIAL

## 2021-05-11 VITALS
HEART RATE: 81 BPM | TEMPERATURE: 98 F | OXYGEN SATURATION: 100 % | DIASTOLIC BLOOD PRESSURE: 79 MMHG | SYSTOLIC BLOOD PRESSURE: 132 MMHG | RESPIRATION RATE: 16 BRPM

## 2021-05-11 VITALS
OXYGEN SATURATION: 95 % | SYSTOLIC BLOOD PRESSURE: 121 MMHG | DIASTOLIC BLOOD PRESSURE: 77 MMHG | HEART RATE: 88 BPM | HEIGHT: 67 IN | RESPIRATION RATE: 18 BRPM | TEMPERATURE: 98 F | WEIGHT: 123.02 LBS

## 2021-05-11 PROCEDURE — 99283 EMERGENCY DEPT VISIT LOW MDM: CPT | Mod: 25

## 2021-05-11 PROCEDURE — 96372 THER/PROPH/DIAG INJ SC/IM: CPT

## 2021-05-11 PROCEDURE — 99284 EMERGENCY DEPT VISIT MOD MDM: CPT

## 2021-05-11 RX ORDER — ACETAMINOPHEN 500 MG
975 TABLET ORAL ONCE
Refills: 0 | Status: COMPLETED | OUTPATIENT
Start: 2021-05-11 | End: 2021-05-11

## 2021-05-11 RX ORDER — KETOROLAC TROMETHAMINE 30 MG/ML
15 SYRINGE (ML) INJECTION ONCE
Refills: 0 | Status: DISCONTINUED | OUTPATIENT
Start: 2021-05-11 | End: 2021-05-11

## 2021-05-11 RX ORDER — DIAZEPAM 5 MG
1 TABLET ORAL
Qty: 9 | Refills: 0
Start: 2021-05-11 | End: 2021-05-13

## 2021-05-11 RX ORDER — DIAZEPAM 5 MG
2 TABLET ORAL ONCE
Refills: 0 | Status: DISCONTINUED | OUTPATIENT
Start: 2021-05-11 | End: 2021-05-11

## 2021-05-11 RX ADMIN — Medication 2 MILLIGRAM(S): at 11:40

## 2021-05-11 RX ADMIN — Medication 30 MILLILITER(S): at 11:40

## 2021-05-11 RX ADMIN — Medication 15 MILLIGRAM(S): at 11:40

## 2021-05-11 RX ADMIN — Medication 975 MILLIGRAM(S): at 11:40

## 2021-05-11 NOTE — ED PROVIDER NOTE - SECONDARY DIAGNOSIS.
Pressure in head Trapezius muscle strain, right, initial encounter Concussion without loss of consciousness, initial encounter

## 2021-05-11 NOTE — ED PROVIDER NOTE - NSFOLLOWUPINSTRUCTIONS_ED_ALL_ED_FT
There were no signs of an emergency medical condition on completion of today's workup.  You will need further medical care and evaluation. A presumptive diagnosis has been made, however further evaluation may be required by your primary care doctor or specialist for a definitive diagnosis.      Take valium as written for breakthrough pain and muscle tightness. Do not drive or operate equipment while under the influence of valium.    Follow up with your medical doctor in 2-3 days or call our clinic at [phone] and state you were seen in the Emergency Department and would like to be seen in clinic. You may also call [phone] to speak with a representative to assist follow up care with medicine, surgery, or specialists.    If you are having pain, take Tylenol/acetaminophen 1 g every six hours and supplement (if allowed by your physician, and if you're not having gastric/gastrointestinal/stomach/intestinal problems) with ibuprofen 600 mg, with food or milk/maalox, every six hours which can be taken three hours apart from the Tylenol to have a layered effect.     Be sure to take no more than 4000mg or 4g of Tylenol/acetaminophen in a 24 hour period. Be sure to check your other medications to see if they include Tylenol/acetaminophen and include them in your calculations to ensure you do not take more than 4000mg or 4g of Tylenol/acetaminophen a day.    Drink at least 2 Liters or 64 Ounces of water each day (UNLESS you are supposed to restrict fluids or have a history of congestive heart failure (CHF)).    Return for any persistent, worsening symptoms, or ANY concerns at all.     Concussion  A concussion is a brain injury from a direct hit (blow) to the head or body. This blow causes the brain to shake quickly back and forth inside the skull. This can damage brain cells and cause chemical changes in the brain. A concussion may also be known as a mild traumatic brain injury (TBI).    Concussions are usually not life-threatening, but the effects of a concussion can be serious. You are more likely to experience concussion-like symptoms after a direct blow to the head in the future.    What are the causes?  This condition is caused by:    A direct blow to the head, such as from running into another player during a game, being hit in a fight, or falling and hitting the head on a hard surface.  A jolt of the head or neck that causes the brain to move back and forth inside the skull, such as in a car crash.    What are the signs or symptoms?  The signs of a concussion can be hard to notice. Early on, they may be missed by you, family members, and health care providers. Your child may look fine but act or seem different.    Symptoms are usually temporary, but they may last for days, weeks, or even longer. Some symptoms may appear right away but other symptoms may not show up for hours or days. Every head injury is different. Symptoms may include:    Headaches. This can include a feeling of pressure in the head.  Memory problems.  Trouble concentrating, organizing, or making decisions.  Slowness in thinking, acting, speaking, or reading.  Confusion.  Fatigue.  Changes in eating or sleeping patterns.  Problems with coordination or balance.  Nausea or vomiting.  Numbness or tingling.  Sensitivity to light or noise.  Vision or hearing problems.  Reduced sense of smell.  Irritability or mood changes.  Dizziness.  Lack of motivation.  Seeing or hearing things that other people do not see or hear (hallucinations).    How is this diagnosed?  This condition is diagnosed based on:    Imaging tests, such as a CT scan or MRI. These are done to look for signs of brain injury.  Neuropsychological tests. These measure your thinking, understanding, learning, and remembering abilities.    How is this treated?  This condition is treated with physical and mental rest and careful observation, usually at home. If the concussion is severe, your child may need to stay home from school for a while.    Children have shown benefit with early light physical activity and this possibly helps in adults in reducing the occurrence of post concussion syndrome.     Your may follow with a concussion clinic or to other health care providers for management.  It is important to tell your health care provider if you are taking any medicines, including prescription medicines, over-the-counter medicines, and natural remedies. Some medicines, such as blood thinners (anticoagulants) and aspirin, may increase the chance of complications, such as bleeding.  How fast you will recover from a concussion depends on many factors, such as how severe the concussion is, what part of the brain was injured, how old you are, and how healthy you were before the concussion.  Recovery can take time. It is important for you to wait to return to mental activities until a health care provider says it is safe to do that and your child's symptoms are completely gone.  Follow these instructions at home:  Activity     Limit your activities that require a lot of thought or focused attention, such as:    Watching TV.  Playing memory games and puzzles.  Doing homework.  Working on the computer.      As above do light physical activity such as walking and playing at low heights with little to no risk of falling or re-injurying the head and/or brain.  Mental rest helps the brain to heal. Make sure you:    Gets plenty of sleep at night. Avoid staying up late at night.  Keep the same bedtime hours on weekends and weekdays.  Rest during the day. Take naps or rest breaks when you feel tired.    Having another concussion before the first one has healed can be dangerous. Stay away from high-risk activities that could cause a second concussion, such as:    Riding a bicycle.  Playing sports.  Participating in gym class or recess activities.  Climbing on playground equipment.    Ask your health care provider when it is safe for you to return to her or his regular activities. Your ability to react may be slower after a brain injury. Your health care provider will likely give you a plan for gradually having your child return to activities.  General instructions     Watch your yourself carefully for new or worsening symptoms.    Wear a seat belt when riding in a car.  Wear a helmet when biking, skiing, skateboarding, skating, or doing similar activities.  Avoid activities that could lead to a second concussion, such as contact sports or recreational sports, until your health care provider says it is okay.    Contact a health care provider if:  Your symptoms get worse.  You develop new symptoms.  You continue to have symptoms for more than 2 weeks.  Get help right away if:  The pupil of one of your eyes is larger than the other.  You lose consciousness.  You cannot recognize people or places.  You have slurred speech.  You have a seizure or convulsions.  You have severe or worsening headaches.    Summary  A concussion is a brain injury from a direct hit (blow) to the head or body.  A concussion may also be called a mild traumatic brain injury (TBI).  Your may have imaging tests and neuropsychological tests to diagnose a concussion.  This condition is treated with physical and mental rest and careful observation.  Ask your health care provider when it is safe for your child to return to his or her regular activities. Have your child follow safety instructions as told by his or her health care provider.  This information is not intended to replace advice given to you by your health care provider. Make sure you discuss any questions you have with your health care provider.    Follow up:  For concussion follow up you may call [facility] Concussion specialist and/or neurology:     Follow up with [facility] at [address], [place], [place], [address], Call [phone] and arrange an appointment. They are open on Tuesdays from 2pm-9pm, call ahead and arrange insurance and/or referral needs.  Appointments are available Tuesdays from 2:00pm-8pm.     Follow up with your neurologist in 2-3 days or call our clinic at [phone].

## 2021-05-11 NOTE — ED PROVIDER NOTE - CARE PROVIDER_API CALL
Tyrell Johnson (DO)  Neurology  [address], [address]  [place] [address]  Phone: [phone]  Fax: [phone]  Follow Up Time: 4-6 Days

## 2021-05-11 NOTE — ED PROVIDER NOTE - NSFOLLOWUPCLINICS_GEN_ALL_ED_FT
[facility]  Sports Medicine  [address] [place] [address]  Phone: [phone]  Fax:
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
## 2021-05-11 NOTE — ED PROVIDER NOTE - CARE PLAN
Principal Discharge DX:	Neck tightness  Secondary Diagnosis:	Pressure in head  Secondary Diagnosis:	Concussion without loss of consciousness, initial encounter  Secondary Diagnosis:	Trapezius muscle strain, right, initial encounter

## 2021-05-11 NOTE — ED PROVIDER NOTE - NSPTACCESSSVCSAPPTDETAILS_ED_ALL_ED_FT
Please assist patient in follow up with neurology, Dr. Lindsay.     Please assist patient in follow up with our sports medicine team if she cannot get in with neurology.
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
## 2021-05-11 NOTE — ED PROVIDER NOTE - PHYSICAL EXAMINATION
GEN: mild distress secondary to concern for health and right sided neck pain and head pressure, awake, eyes open spontaneously  EYES: normal conjunctiva, perrl, eomi  ENT: NCAT, MMM, Trachea midline  CHEST/LUNGS: Non-tachypneic, CTAB, bilateral breath sounds  CARDIAC: Non-tachycardic, normal perfusion  ABDOMEN: Soft, NTND, No rebound/guarding  MSK: No edema, no gross deformity of extremities  SKIN: No rashes, no petechiae, no vesicles  NEURO: CN grossly intact, normal coordination, no focal motor or sensory deficits, CN 2 normal sight, 3,4,6 eomi and eyelid movement normal, 5,7 normal gritting of teeth and opening of mouth and sensation to face, 8 normal hearing for patient, 9,10 normal swallowing and phonation, 11 normal shoulder shrug, 12 normal tongue movement and articulation, normal coordination, normal finger to nose, normal heel to shin, negative Romberg, no pronator drift, no gait instability, 5/5 strength in upper and lower extremities, normal sensory throughout, alert and oriented to person, place, time, and situation.   PSYCH: Alert, appropriate, cooperative, with capacity and insight

## 2021-05-11 NOTE — ED PROVIDER NOTE - PATIENT PORTAL LINK FT
You can access the FollowMyHealth Patient Portal offered by [facility] by registering at the following website: [email or web address]. By joining [facility]’s FollowMyHealth portal, you will also be able to view your health information using other applications (apps) compatible with our system.

## 2021-05-11 NOTE — ED PROVIDER NOTE - NS ED ROS FT
head pressure  right neck pain  no numbness, no weakness, no difficulty walking  ROS as per HPI, attending statement, or otherwise negative.

## 2021-05-11 NOTE — ED PROVIDER NOTE - CLINICAL SUMMARY MEDICAL DECISION MAKING FREE TEXT BOX
Kayden Case MD, FACEP: In this physician's medical judgement based on clinical history and physical exam, patient with head pressure, denies headache, right neck pain with tightness, signs and symptoms consistent with cervical and trapezius strain with post concussive like symptoms.   will offer analgesia, patient encouraged to follow up with neurology and an internal primary medical doctor for post concussive symptom clearance.

## 2021-06-15 ENCOUNTER — NON-APPOINTMENT (OUTPATIENT)
Age: 46
End: 2021-06-15

## 2021-07-13 ENCOUNTER — EMERGENCY (EMERGENCY)
Facility: HOSPITAL | Age: 46
LOS: 1 days | Discharge: HOME | End: 2021-07-13
Attending: EMERGENCY MEDICINE
Payer: COMMERCIAL

## 2021-07-13 VITALS
RESPIRATION RATE: 16 BRPM | OXYGEN SATURATION: 99 % | TEMPERATURE: 98 F | HEART RATE: 66 BPM | DIASTOLIC BLOOD PRESSURE: 78 MMHG | SYSTOLIC BLOOD PRESSURE: 118 MMHG

## 2021-07-13 VITALS
DIASTOLIC BLOOD PRESSURE: 85 MMHG | OXYGEN SATURATION: 100 % | SYSTOLIC BLOOD PRESSURE: 138 MMHG | HEIGHT: 67 IN | WEIGHT: 123.02 LBS | TEMPERATURE: 99 F | RESPIRATION RATE: 16 BRPM | HEART RATE: 75 BPM

## 2021-07-13 LAB
ALBUMIN SERPL ELPH-MCNC: 4.8 G/DL — SIGNIFICANT CHANGE UP (ref 3.3–5)
ALP SERPL-CCNC: 75 U/L — SIGNIFICANT CHANGE UP (ref 40–120)
ALT FLD-CCNC: 24 U/L — SIGNIFICANT CHANGE UP (ref 10–45)
ANION GAP SERPL CALC-SCNC: 12 MMOL/L — SIGNIFICANT CHANGE UP (ref 5–17)
APTT BLD: 29.8 SEC — SIGNIFICANT CHANGE UP (ref 27.5–35.5)
AST SERPL-CCNC: 26 U/L — SIGNIFICANT CHANGE UP (ref 10–40)
BASE EXCESS BLDV CALC-SCNC: 3.1 MMOL/L — HIGH (ref -2–2)
BASOPHILS # BLD AUTO: 0.05 K/UL — SIGNIFICANT CHANGE UP (ref 0–0.2)
BASOPHILS NFR BLD AUTO: 1.4 % — SIGNIFICANT CHANGE UP (ref 0–2)
BILIRUB SERPL-MCNC: 1.4 MG/DL — HIGH (ref 0.2–1.2)
BUN SERPL-MCNC: 16 MG/DL — SIGNIFICANT CHANGE UP (ref 7–23)
CALCIUM SERPL-MCNC: 10 MG/DL — SIGNIFICANT CHANGE UP (ref 8.4–10.5)
CHLORIDE SERPL-SCNC: 100 MMOL/L — SIGNIFICANT CHANGE UP (ref 96–108)
CO2 BLDV-SCNC: 30 MMOL/L — SIGNIFICANT CHANGE UP (ref 22–30)
CO2 SERPL-SCNC: 26 MMOL/L — SIGNIFICANT CHANGE UP (ref 22–31)
CREAT SERPL-MCNC: 1.05 MG/DL — SIGNIFICANT CHANGE UP (ref 0.5–1.3)
EOSINOPHIL # BLD AUTO: 0.22 K/UL — SIGNIFICANT CHANGE UP (ref 0–0.5)
EOSINOPHIL NFR BLD AUTO: 6.1 % — HIGH (ref 0–6)
GLUCOSE SERPL-MCNC: 82 MG/DL — SIGNIFICANT CHANGE UP (ref 70–99)
HCG SERPL-ACNC: <2 MIU/ML — SIGNIFICANT CHANGE UP
HCO3 BLDV-SCNC: 28 MMOL/L — SIGNIFICANT CHANGE UP (ref 21–29)
HCT VFR BLD CALC: 41.1 % — SIGNIFICANT CHANGE UP (ref 34.5–45)
HGB BLD-MCNC: 14 G/DL — SIGNIFICANT CHANGE UP (ref 11.5–15.5)
IMM GRANULOCYTES NFR BLD AUTO: 0.6 % — SIGNIFICANT CHANGE UP (ref 0–1.5)
INR BLD: 0.98 RATIO — SIGNIFICANT CHANGE UP (ref 0.88–1.16)
LYMPHOCYTES # BLD AUTO: 1.27 K/UL — SIGNIFICANT CHANGE UP (ref 1–3.3)
LYMPHOCYTES # BLD AUTO: 35.2 % — SIGNIFICANT CHANGE UP (ref 13–44)
MCHC RBC-ENTMCNC: 30.9 PG — SIGNIFICANT CHANGE UP (ref 27–34)
MCHC RBC-ENTMCNC: 34.1 GM/DL — SIGNIFICANT CHANGE UP (ref 32–36)
MCV RBC AUTO: 90.7 FL — SIGNIFICANT CHANGE UP (ref 80–100)
MONOCYTES # BLD AUTO: 0.48 K/UL — SIGNIFICANT CHANGE UP (ref 0–0.9)
MONOCYTES NFR BLD AUTO: 13.3 % — SIGNIFICANT CHANGE UP (ref 2–14)
NEUTROPHILS # BLD AUTO: 1.57 K/UL — LOW (ref 1.8–7.4)
NEUTROPHILS NFR BLD AUTO: 43.4 % — SIGNIFICANT CHANGE UP (ref 43–77)
NRBC # BLD: 0 /100 WBCS — SIGNIFICANT CHANGE UP (ref 0–0)
PCO2 BLDV: 48 MMHG — SIGNIFICANT CHANGE UP (ref 35–50)
PH BLDV: 7.39 — SIGNIFICANT CHANGE UP (ref 7.35–7.45)
PLATELET # BLD AUTO: 214 K/UL — SIGNIFICANT CHANGE UP (ref 150–400)
PO2 BLDV: 26 MMHG — SIGNIFICANT CHANGE UP (ref 25–45)
POTASSIUM SERPL-MCNC: 3.8 MMOL/L — SIGNIFICANT CHANGE UP (ref 3.5–5.3)
POTASSIUM SERPL-SCNC: 3.8 MMOL/L — SIGNIFICANT CHANGE UP (ref 3.5–5.3)
PROT SERPL-MCNC: 8.3 G/DL — SIGNIFICANT CHANGE UP (ref 6–8.3)
PROTHROM AB SERPL-ACNC: 11.8 SEC — SIGNIFICANT CHANGE UP (ref 10.6–13.6)
RBC # BLD: 4.53 M/UL — SIGNIFICANT CHANGE UP (ref 3.8–5.2)
RBC # FLD: 12.4 % — SIGNIFICANT CHANGE UP (ref 10.3–14.5)
SAO2 % BLDV: 44 % — LOW (ref 67–88)
SODIUM SERPL-SCNC: 138 MMOL/L — SIGNIFICANT CHANGE UP (ref 135–145)
TROPONIN T, HIGH SENSITIVITY RESULT: <6 NG/L — SIGNIFICANT CHANGE UP (ref 0–51)
WBC # BLD: 3.61 K/UL — LOW (ref 3.8–10.5)
WBC # FLD AUTO: 3.61 K/UL — LOW (ref 3.8–10.5)

## 2021-07-13 PROCEDURE — 93005 ELECTROCARDIOGRAM TRACING: CPT

## 2021-07-13 PROCEDURE — 82962 GLUCOSE BLOOD TEST: CPT

## 2021-07-13 PROCEDURE — 70496 CT ANGIOGRAPHY HEAD: CPT | Mod: 26,MA

## 2021-07-13 PROCEDURE — 84702 CHORIONIC GONADOTROPIN TEST: CPT

## 2021-07-13 PROCEDURE — 96374 THER/PROPH/DIAG INJ IV PUSH: CPT

## 2021-07-13 PROCEDURE — 70498 CT ANGIOGRAPHY NECK: CPT

## 2021-07-13 PROCEDURE — 84484 ASSAY OF TROPONIN QUANT: CPT

## 2021-07-13 PROCEDURE — 85610 PROTHROMBIN TIME: CPT

## 2021-07-13 PROCEDURE — 85730 THROMBOPLASTIN TIME PARTIAL: CPT

## 2021-07-13 PROCEDURE — 85025 COMPLETE CBC W/AUTO DIFF WBC: CPT

## 2021-07-13 PROCEDURE — 70496 CT ANGIOGRAPHY HEAD: CPT

## 2021-07-13 PROCEDURE — 80053 COMPREHEN METABOLIC PANEL: CPT

## 2021-07-13 PROCEDURE — 99284 EMERGENCY DEPT VISIT MOD MDM: CPT | Mod: 25

## 2021-07-13 PROCEDURE — 99285 EMERGENCY DEPT VISIT HI MDM: CPT

## 2021-07-13 PROCEDURE — 70498 CT ANGIOGRAPHY NECK: CPT | Mod: 26,MA

## 2021-07-13 PROCEDURE — 70450 CT HEAD/BRAIN W/O DYE: CPT

## 2021-07-13 PROCEDURE — 82803 BLOOD GASES ANY COMBINATION: CPT

## 2021-07-13 RX ORDER — METOCLOPRAMIDE HCL 10 MG
10 TABLET ORAL ONCE
Refills: 0 | Status: COMPLETED | OUTPATIENT
Start: 2021-07-13 | End: 2021-07-13

## 2021-07-13 RX ORDER — MECLIZINE HCL 12.5 MG
1 TABLET ORAL
Qty: 15 | Refills: 0
Start: 2021-07-13 | End: 2021-07-17

## 2021-07-13 RX ORDER — MECLIZINE HCL 12.5 MG
50 TABLET ORAL ONCE
Refills: 0 | Status: COMPLETED | OUTPATIENT
Start: 2021-07-13 | End: 2021-07-13

## 2021-07-13 RX ADMIN — Medication 10 MILLIGRAM(S): at 10:48

## 2021-07-13 RX ADMIN — Medication 50 MILLIGRAM(S): at 10:48
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
## 2021-07-13 NOTE — ED PROVIDER NOTE - PROGRESS NOTE DETAILS
Moncho Kinney D.O., PGY3 (Resident)  Repeat neuro exam with no focal neurologic deficits. No nsytagmus.   -Cranial Nerves:  --CN II: PERRL  --CN III, IV, VI: EOMI b/l  --CN V1-3: Facial sensation intact to touch  --CN VII: No facial asymmetry or droop  --CN VIII: Hearing intact to rubbing fingers b/l  --CN IX, X: Palate elevates symmetrically. Normal phonation  --CN XI: Heading turning and shoulder shrug intact b/l  --CN XII: Tongue midline with normal movements Moncho Kinney D.O., PGY3 (Resident)  Patient feels improved. steady gait. Results endorsed including unexpected incidental findings (copy of reports provided to patient). Return precautions given. Patient expressed verbal understanding. All questions answered.  Will DC with outpatient follow-up.

## 2021-07-13 NOTE — ED PROVIDER NOTE - NSFOLLOWUPINSTRUCTIONS_ED_ALL_ED_FT
YOU WERE SEEN IN THE ED FOR: dizziness    YOU WERE PRESCRIBED: meclizine  FOLLOW THE INSTRUCTIONS ON THE LABEL/CONTAINER    PLEASE FOLLOW UP WITH YOUR PRIVATE PHYSICIAN WITHIN THE NEXT 48 HOURS. BRING COPIES OF YOUR RESULTS.    Please follow up with General neurology at [address] [place] [address] 1-2 weeks after discharge. Please call [phone] to schedule this appointment.    RETURN TO THE EMERGENCY DEPARTMENT IF YOU EXPERIENCE ANY NEW/CONCERNING/WORSENING SYMPTOMS.

## 2021-07-13 NOTE — ED PROVIDER NOTE - NS ED ROS FT
CONSTITUTIONAL: +dizziness. No fevers, chills, fatigue, weakness, unexpected weight change  EYES: +blurry vision. No double vision  ENT: + right ear fullness. No nasal congestion, runny nose, sore throat  CV: No chest pain, palpitations  PULM: No cough, shortness of breath  GI: No abdominal pain, nausea, vomiting, diarrhea, constipation  : No dysuria, polyuria, hematuria  SKIN: No rashes, swelling  MSK: No muscle aches  NEURO: + headache No paresthesias

## 2021-07-13 NOTE — CONSULT NOTE ADULT - ASSESSMENT
A 45 year old female with PMH of meniere's disease (right sided), GERD, migraine headaches, cluster headaches and hiatal hernia has presented to the ED due to continuous symptoms of nausea, photophobia, neck stiffness, right sided head pressure, left sided facial numbness, vertigo w/ unsteady gait since 11: 30 AM yesterday after she received an EEG. This morning she has some symptoms of blurry vision as well. Neurological exam was benign/intact. CT brain CTA head/neck were negative.     NIHSS 0  preMRS 0    Impression: nausea/photophobia/neck stiffness/right sided head pressure/blurry vision/vertigo may be 2/2 possible migraine vs. complex migraines vs. ocular migraine vs. meneire's 2/2 unknown etiology.     Recommendations:  []Medication for migraines- 50 mg sumatriptan   [] If pt experiences symptoms of meniere's such as dizziness may use meclizine.   [] CBC, CMP to rule out infectious etiology  []Unlikely pt had a stroke as imaging was negative  [] Patient can follow up with general neurology at [address] [place] [address] 1-2 weeks after discharge. Please instruct the patient to call [phone] to schedule this appointment.       Case to be discussed with Neurologist,  Dr. Hooker.         A 45 year old female with PMH of meniere's disease (right sided), GERD, migraine headaches, cluster headaches and hiatal hernia has presented to the ED due to continuous symptoms of nausea, photophobia, neck stiffness, right sided head pressure, left sided facial numbness, vertigo w/ unsteady gait since 11: 30 AM yesterday after she received an EEG. This morning she has some symptoms of blurry vision as well. Neurological exam was benign/intact. CT brain CTA head/neck were negative.     NIHSS 0  preMRS 0    Impression: nausea/photophobia/neck stiffness/right sided head pressure/blurry vision/vertigo may be 2/2 possible migraine vs. complex migraines vs. ocular migraine vs. meneire's 2/2 unknown etiology.     Recommendations:  []Medication for migraines- migraine cocktail- Toradol, Benadryl, Mag.   [] If pt experiences symptoms of meniere's such as dizziness may use meclizine.   [] CBC, CMP to rule out infectious etiology  []Unlikely pt had a stroke as imaging was negative  [] Patient can follow up with general neurology at [address] [place] [address] 1-2 weeks after discharge. Please instruct the patient to call [phone] to schedule this appointment.       Case to be discussed with Neurologist,  Dr. Hooker.         A 45 year old female with PMH of meniere's disease (right sided), GERD, migraine headaches, cluster headaches and hiatal hernia has presented to the ED due to continuous symptoms of nausea, photophobia, neck stiffness, right sided head pressure, left sided facial numbness, vertigo w/ unsteady gait since 11: 30 AM yesterday after she received an EEG. This morning she has some symptoms of blurry vision as well. Neurological exam was benign/intact. CT brain CTA head/neck were negative.     NIHSS 0  preMRS 0    Impression: nausea/photophobia/neck stiffness/right sided head pressure/blurry vision/vertigo may be 2/2 possible migraine vs. complex migraines vs. ocular migraine vs. meneire's 2/2 unknown etiology.     Recommendations:  []Medication for migraines- migraine cocktail- Toradol, Benadryl, Mag.   [] If pt experiences symptoms of meniere's such as dizziness may use meclizine.   [] CBC, CMP to rule out infectious etiology  [] Patient can follow up with general neurology at [address] [place] [address] 1-2 weeks after discharge. Please instruct the patient to call [phone] to schedule this appointment with Dr. Johnson      Case to be discussed with Neurologist,  Dr. Hooker.

## 2021-07-13 NOTE — ED PROVIDER NOTE - PHYSICAL EXAMINATION
GENERAL: young female, lying in bed, NAD. Vital signs are within normal limits  EYES: Conjunctiva noninjected or pale, sclera anicteric  HENT: NC/AT, moist mucous membranes  NECK: Supple, trachea midline  LUNG: Nonlabored respirations, no wheezes, rales  CV: RRR, Pulses- Radial/dorsalis pedis: 2+ bilateral and equal  ABDOMEN: Nondistended, nontender  MSK: No visible deformities, nontender extremities  SKIN: No rashes, bruises  NEURO: AAOx4 (to person, place, time, event), no tremor, finger to nose smooth and rapid.  -Cranial Nerves:  --CN II: PERRL  --CN III, IV, VI: EOMI bilateral, no nystgamus  --CN V1-3: Facial sensation intact to touch  --CN VII: No facial asymmetry or droop  --CN VIII: Hearing intact to rubbing fingers b/l  --CN IX, X: Palate elevates symmetrically. Normal phonation  --CN XI: Heading turning and shoulder shrug intact b/l  --CN XII: Tongue midline with normal movements   PSYCH: Normal mood and affect
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
## 2021-07-13 NOTE — ED PROVIDER NOTE - PATIENT PORTAL LINK FT
You can access the FollowMyHealth Patient Portal offered by [facility] by registering at the following website: [email or web address]. By joining [email or web address]’s FollowMyHealth portal, you will also be able to view your health information using other applications (apps) compatible with our system.

## 2021-07-13 NOTE — ED ADULT NURSE NOTE - NSIMPLEMENTINTERV_GEN_ALL_ED
Implemented All Universal Safety Interventions:  [place] to call system. Call bell, personal items and telephone within reach. Instruct patient to call for assistance. Room bathroom lighting operational. Non-slip footwear when patient is off stretcher. Physically safe environment: no spills, clutter or unnecessary equipment. Stretcher in lowest position, wheels locked, appropriate side rails in place.

## 2021-07-13 NOTE — ED PROVIDER NOTE - CLINICAL SUMMARY MEDICAL DECISION MAKING FREE TEXT BOX
45F hx of complex migraines, menieres here for dizziness, blurry vision, headache, ongoing <24 hours. Called as a code stroke. Vitals wnl. No focal neurologic deficits. Patient w/ steady gait w/o focal neurologic deficits. History and exam more consistent with peripheral neurologic etiology not consistent with primary neurovasc etiology (ex. stroke, seizure). Lower suspicion for cardiac pathology. Possible lyte derrangement. Check labs, screening EKG for significant cardiac interval abnormalities, give meclizine and additional antiemetics as warranted. 45F hx of complex migraines, menieres here for dizziness, blurry vision, headache, ongoing <24 hours. Called as a code stroke. Vitals wnl. No focal neurologic deficits. Patient w/ steady gait w/o focal neurologic deficits. History and exam more consistent with peripheral neurologic etiology not consistent with primary neurovasc etiology (ex. stroke, seizure). Lower suspicion for cardiac pathology. Possible lyte derrangement. Check labs, screening EKG for significant cardiac interval abnormalities, give meclizine and additional antiemetics as warranted.    DELPHINE Castanon MD: Agree with resident statement as above. Pt will obtain brain imaging, labs, neurology consult

## 2021-07-13 NOTE — ED ADULT TRIAGE NOTE - AS HEIGHT TYPE
Discharged to home per ambulatory in stable condition with written and verbal instructions. Patient not in active labor. Patient verbalizes understanding of information given. stated

## 2021-07-13 NOTE — ED PROVIDER NOTE - OBJECTIVE STATEMENT
45F hx of Gilbert's disease, migraines?, Meniere disease presents to the ED for left sided neck stiffness, gradual onset headache with intermittent blurry vision (no double vision) that started today but sxs started with numbness to left side of face and dizziness, similar to prior episodes of Meniere's, around 10:30 yesterday while patient was having an EEG?EMG? (patient unsure).

## 2021-07-14 ENCOUNTER — APPOINTMENT (OUTPATIENT)
Dept: NEUROLOGY | Facility: CLINIC | Age: 46
End: 2021-07-14

## 2021-07-21 ENCOUNTER — APPOINTMENT (OUTPATIENT)
Dept: NEUROLOGY | Facility: CLINIC | Age: 46
End: 2021-07-21
Payer: COMMERCIAL

## 2021-07-21 VITALS
SYSTOLIC BLOOD PRESSURE: 102 MMHG | HEIGHT: 67 IN | HEART RATE: 73 BPM | DIASTOLIC BLOOD PRESSURE: 64 MMHG | BODY MASS INDEX: 19.3 KG/M2 | WEIGHT: 123 LBS

## 2021-07-21 DIAGNOSIS — G44.59 OTHER COMPLICATED HEADACHE SYNDROME: ICD-10-CM

## 2021-07-21 PROCEDURE — 99072 ADDL SUPL MATRL&STAF TM PHE: CPT

## 2021-07-21 PROCEDURE — 99214 OFFICE O/P EST MOD 30 MIN: CPT

## 2021-07-21 RX ORDER — TOPIRAMATE 25 MG/1
25 TABLET, FILM COATED ORAL
Qty: 90 | Refills: 1 | Status: ACTIVE | COMMUNITY
Start: 2021-07-21 | End: 1900-01-01

## 2021-07-21 RX ORDER — KETOROLAC TROMETHAMINE 10 MG/1
10 TABLET, FILM COATED ORAL 3 TIMES DAILY
Qty: 90 | Refills: 1 | Status: ACTIVE | COMMUNITY
Start: 2021-07-21 | End: 1900-01-01

## 2021-07-21 NOTE — DISCUSSION/SUMMARY
[FreeTextEntry1] : 1. 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 Acupuncture with Dr Tena\par \par 3. Follow up in 3 months and all questions answered

## 2021-07-21 NOTE — PHYSICAL EXAM
[General Appearance - Alert] : alert [Oriented To Time, Place, And Person] : oriented to person, place, and time [Person] : oriented to person [Place] : oriented to place [Time] : oriented to time [Cranial Nerves Optic (II)] : visual acuity intact bilaterally,  visual fields full to confrontation, pupils equal round and reactive to light [Cranial Nerves Oculomotor (III)] : extraocular motion intact [Cranial Nerves Trigeminal (V)] : facial sensation intact symmetrically [Cranial Nerves Facial (VII)] : face symmetrical [Cranial Nerves Accessory (XI - Cranial And Spinal)] : head turning and shoulder shrug symmetric [Cranial Nerves Hypoglossal (XII)] : there was no tongue deviation with protrusion [Motor Strength] : muscle strength was normal in all four extremities [Involuntary Movements] : no involuntary movements were seen [No Muscle Atrophy] : normal bulk in all four extremities [Motor Handedness Right-Handed] : the patient is right hand dominant [Paresis Pronator Drift Right-Sided] : no pronator drift on the right [Paresis Pronator Drift Left-Sided] : no pronator drift on the left [Motor Strength Upper Extremities Bilaterally] : strength was normal in both upper extremities [Motor Strength Lower Extremities Bilaterally] : strength was normal in both lower extremities [Sensation Tactile Decrease] : light touch was intact [Sensation Pain / Temperature Decrease] : pain and temperature was intact [Romberg's Sign] : Romberg's sign was negtive [Balance] : balance was intact [Limited Balance] : balance was intact [Past-pointing] : there was no past-pointing [Tremor] : no tremor present [Dysdiadochokinesia Bilaterally] : not present [Coordination - Dysmetria Impaired Finger-to-Nose Bilateral] : not present [2+] : Ankle jerk left 2+ [Plantar Reflex Right Only] : normal on the right [Plantar Reflex Left Only] : normal on the left [FreeTextEntry5] : no nystagmus but decreased hearing on right side [Extraocular Movements] : extraocular movements were intact [Neck Appearance] : the appearance of the neck was normal [] : no respiratory distress
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
## 2021-07-21 NOTE — REVIEW OF SYSTEMS
[Fever] : no fever [Chills] : no chills [Feeling Tired] : feeling tired [As Noted in HPI] : as noted in HPI [Numbness] : numbness [Abnormal Sensation] : an abnormal sensation [Dizziness] : no dizziness [Lightheadedness] : no lightheadedness [Migraine Headache] : no migraine headache [Tension Headache] : no tension-type headache [Anxiety] : anxiety [Earache] : earache [Negative] : Heme/Lymph [FreeTextEntry4] : right side

## 2021-08-10 NOTE — ED PROVIDER NOTE - CROS ED NEURO NEG
no change in level of consciousness Siliq Counseling:  I discussed with the patient the risks of Siliq including but not limited to new or worsening depression, suicidal thoughts and behavior, immunosuppression, malignancy, posterior leukoencephalopathy syndrome, and serious infections.  The patient understands that monitoring is required including a PPD at baseline and must alert us or the primary physician if symptoms of infection or other concerning signs are noted. There is also a special program designed to monitor depression which is required with Siliq.

## 2021-08-24 ENCOUNTER — EMERGENCY (EMERGENCY)
Facility: HOSPITAL | Age: 46
LOS: 1 days | Discharge: LEFT BEFORE DISCHARGE | End: 2021-08-24
Payer: COMMERCIAL

## 2021-08-24 VITALS
WEIGHT: 123.02 LBS | RESPIRATION RATE: 20 BRPM | OXYGEN SATURATION: 99 % | SYSTOLIC BLOOD PRESSURE: 124 MMHG | TEMPERATURE: 99 F | HEIGHT: 67 IN | HEART RATE: 84 BPM | DIASTOLIC BLOOD PRESSURE: 80 MMHG

## 2021-08-24 PROCEDURE — L9991: CPT

## 2021-08-24 PROCEDURE — 99281 EMR DPT VST MAYX REQ PHY/QHP: CPT

## 2021-08-27 ENCOUNTER — APPOINTMENT (OUTPATIENT)
Dept: OTOLARYNGOLOGY | Facility: CLINIC | Age: 46
End: 2021-08-27

## 2021-10-22 NOTE — ED ADULT NURSE NOTE - CAS TRG GEN SKIN COLOR
[FreeTextEntry1] : CAD, CMP, and A. flutter-stable.  per card\par follow A1C\par I personally repeated blood pressure and blood pressure=114/66.  Continue same blood pressure medications.\par follow BUN/creat\par Continue same cholesterol medication.  Follow lipid.\par right neck mass-mass is shrinking, per patient.  Patient is schedule for neck ultrasound tomorrow ([place])\par follow CBC\par Patient refuses screening colonoscopy.\par thyroid nodules-per endo Normal for race

## 2021-11-23 ENCOUNTER — APPOINTMENT (OUTPATIENT)
Dept: NEUROLOGY | Facility: CLINIC | Age: 46
End: 2021-11-23

## 2022-04-04 NOTE — ED PROVIDER NOTE - TEMPLATE
General Information: Selecting Yes will display possible errors in your note based on the variables you have selected. This validation is only offered as a suggestion for you. PLEASE NOTE THAT THE VALIDATION TEXT WILL BE REMOVED WHEN YOU FINALIZE YOUR NOTE. IF YOU WANT TO FAX A PRELIMINARY NOTE YOU WILL NEED TO TOGGLE THIS TO 'NO' IF YOU DO NOT WANT IT IN YOUR FAXED NOTE.

## 2022-05-23 ENCOUNTER — APPOINTMENT (OUTPATIENT)
Dept: OTOLARYNGOLOGY | Facility: CLINIC | Age: 47
End: 2022-05-23
Payer: COMMERCIAL

## 2022-05-23 VITALS — TEMPERATURE: 97.3 F | DIASTOLIC BLOOD PRESSURE: 68 MMHG | HEART RATE: 70 BPM | SYSTOLIC BLOOD PRESSURE: 104 MMHG

## 2022-05-23 PROCEDURE — 99214 OFFICE O/P EST MOD 30 MIN: CPT

## 2022-05-23 NOTE — END OF VISIT
[Time Spent: ___ minutes] : I have spent [unfilled] minutes of time on the encounter. [FreeTextEntry3] : I personally saw and examined VALERIA ROSARIO in detail. I spoke to JUANA Flores regarding the assessment and plan of care. I reviewed the above assessment and plan of care, and agree. I have made changes in changes in the body of the note where appropriate.I personally reviewed the HPI, PMH, FH, SH, ROS and medications/allergies. I have spoken to JUANA Flores regarding the history and have personally determined the assessment and plan of care, and documented this myself. I was present and participated in all key portions of the encounter and all procedures noted above. I have made changes in the body of the note where appropriate.\par \par Attesting Faculty: See Attending Signature Below \par \par \par

## 2022-05-23 NOTE — ASSESSMENT
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

## 2022-05-23 NOTE — PHYSICAL EXAM
[Midline] : trachea located in midline position [Normal] : gait was normal [Fukuda Step Test] : Fukuda Step Test was Positive [Nystagmus] : ~T no ~M nystagmus was seen [Mauricio-Halllocoke] : Winifred-Hallpike: Negative [de-identified] : weak step test

## 2022-05-23 NOTE — HISTORY OF PRESENT ILLNESS
[No] : patient does not have a  history of radiation therapy [Tinnitus] : tinnitus [Vertigo] : vertigo [Dizziness] : dizziness [Hearing Loss] : hearing loss [de-identified] : 47 yo female\par Patient with hx of right sided Menieres Disease with tinnitus hearing loss,vertigo and Migraines presents for follow up.  States she has been getting more dizzy since she last saw us. She wakes up every day dizzy and off balanced. TInnitus has got worse. \par was better when taking betahistine-low dose\par \par  [Anxiety] : no anxiety [Headache] : no headache [Recurrent Otitis Media] : no recurrent otitis media [Otitis Media with Effusion] : no otitis media with effusion [Presbycusis] : no presbycusis [Congenital Ear Malformation] : no congenital ear malformation [Meniere Disease] : no Meniere disease [Otosclerosis] : no otosclerosis [Perilymphatic Fistula] : no perilymphatic fistula [Hypertension] : no hypertension [Loud Noise Exposure] : no history of loud noise exposure [Smoking] : no smoking [Early Onset Hearing Loss] : no early onset hearing loss [Stroke] : no stroke [Facial Pain] : no facial pain [Facial Pressure] : no facial pressure [Nasal Congestion] : no nasal congestion [Diplopia] : no diplopia [Ear Fullness] : no ear fullness [Allergic Rhinitis] : no allergic rhinitis [Maxillary Tooth Infection] : no maxillary tooth infection [Septal Deviation] : no septal deviation [Environmental Allergies] : no environmental allergies [Environmental Allergens] : no environmental allergens [Adenoidectomy] : no adenoidectomy [Nasal FB Removal] : no nasal foreign body removal [Allergies] : no allergies [Asthma] : no asthma [Neck Mass] : no neck mass [Neck Pain] : no neck pain [Chills] : no chills [Cold Intolerance] : no cold intolerance [Cough] : no cough [Fatigue] : no fatigue [Heat Intolerance] : no heat intolerance [Hyperthyroidism] : no hyperthyroidism [Sialadenitis] : no sialadenitis [Hodgkin Disease] : no hodgkin disease [Non-Hodgkin Lymphoma] : no non-hodgkin lymphoma [Tobacco Use] : no tobacco use [Alcohol Use] : no alcohol use [Graves Disease] : no graves disease [Thyroid Cancer] : no thyroid cancer

## 2022-08-09 NOTE — ED PROVIDER NOTE - NEUROLOGICAL SPEECH
clear Pt. a&ox4 ambulatory hx of asthma BIBEMS when pt. had syncope episode with seizure-like full body twitch / convulsions, witnessed by wife lasting approx 1-2 mins, with eyes rolling back in head, when pt. was [place]. Pt. denies hx of seizures, or recollecting the syncope. Pt. reports he was walking, began sweating profusely, experienced tunnel vision, HA, tinnitus, and dizziness, sat down on the bench nearby, and next thing he recalls is waking up. Pt. never fell from the bench onto the ground, denies biting of tongue, denies unilateral weakness, difficulty speaking, or postictal state of lethargy following waking up. Pt. wife denies foaming @ mouth. Pt. reports when he regained consciousness, he was experiencing SOB, felt similar to an asthma exacerbation, took his MDI Albuterol 5x with relief. EMS on scene reports pt. never desatted. Pt. made UG to MD [place], placed on ccm, pulseox, and auto BP. Rectal temp obtained for r/o heat stroke. Neuro, motor, sensation, and PERRLA intact on exam BL.

## 2022-08-18 ENCOUNTER — APPOINTMENT (OUTPATIENT)
Dept: OTOLARYNGOLOGY | Facility: CLINIC | Age: 47
End: 2022-08-18

## 2022-08-18 PROCEDURE — 92547 SUPPLEMENTAL ELECTRICAL TEST: CPT

## 2022-08-18 PROCEDURE — 92540 BASIC VESTIBULAR EVALUATION: CPT

## 2022-08-18 PROCEDURE — ZZZZZ: CPT

## 2022-08-18 PROCEDURE — 92584 ELECTROCOCHLEOGRAPHY: CPT

## 2022-08-18 PROCEDURE — 92567 TYMPANOMETRY: CPT

## 2022-08-18 PROCEDURE — 92537 CALORIC VSTBLR TEST W/REC: CPT

## 2022-08-20 ENCOUNTER — NON-APPOINTMENT (OUTPATIENT)
Age: 47
End: 2022-08-20

## 2022-08-22 DIAGNOSIS — H90.3 SENSORINEURAL HEARING LOSS, BILATERAL: ICD-10-CM

## 2022-08-23 ENCOUNTER — NON-APPOINTMENT (OUTPATIENT)
Age: 47
End: 2022-08-23

## 2022-09-21 ENCOUNTER — APPOINTMENT (OUTPATIENT)
Dept: OTOLARYNGOLOGY | Facility: CLINIC | Age: 47
End: 2022-09-21

## 2022-09-21 VITALS
BODY MASS INDEX: 18.52 KG/M2 | WEIGHT: 118 LBS | DIASTOLIC BLOOD PRESSURE: 72 MMHG | HEIGHT: 67 IN | TEMPERATURE: 96.3 F | SYSTOLIC BLOOD PRESSURE: 108 MMHG | HEART RATE: 74 BPM

## 2022-09-21 DIAGNOSIS — R42 DIZZINESS AND GIDDINESS: ICD-10-CM

## 2022-09-21 DIAGNOSIS — H93.13 TINNITUS, BILATERAL: ICD-10-CM

## 2022-09-21 PROCEDURE — 99213 OFFICE O/P EST LOW 20 MIN: CPT

## 2022-09-21 NOTE — HISTORY OF PRESENT ILLNESS
[de-identified] : 48 yo female\par Patient with hx of Right Menieres Disease presents for follow up. States she has some days where she gets dizzy but not as severe and some days she feels great. Her hearing improved since last visit. She is watching her salt intake.  [Tinnitus] : tinnitus [Vertigo] : vertigo [Dizziness] : dizziness [Hearing Loss] : hearing loss [None] : The patient is currently asymptomatic.

## 2022-09-21 NOTE — ASSESSMENT
[FreeTextEntry1] : Ms. ROSARIO 47 year F with Right Menieres Disease presents for follow. Dizziness and hearing is improving \par \par Right Menieres Disease:\par -continue low salt diet, handout given \par -acupuncture advised \par \par \par f/u 3 months or prn

## 2022-10-27 ENCOUNTER — APPOINTMENT (OUTPATIENT)
Dept: OPHTHALMOLOGY | Facility: CLINIC | Age: 47
End: 2022-10-27

## 2022-12-21 ENCOUNTER — APPOINTMENT (OUTPATIENT)
Dept: OTOLARYNGOLOGY | Facility: CLINIC | Age: 47
End: 2022-12-21

## 2023-05-20 NOTE — ED PROVIDER NOTE - PRINCIPAL DIAGNOSIS
09-Nov-2022 11:34
09-Nov-2022 18:45
negative - no vomiting, no diarrhea
GERD (gastroesophageal reflux disease)

## 2023-05-27 NOTE — END OF VISIT
[FreeTextEntry3] : I personally saw and examined VALERIA ROSARIO in detail. I spoke to JUANA Flores regarding the assessment and plan of care. I reviewed the above assessment and plan of care, and agree. I have made changes in changes in the body of the note where appropriate.I personally reviewed the HPI, PMH, FH, SH, ROS and medications/allergies. I have spoken to JUANA Flores regarding the history and have personally determined the assessment and plan of care, and documented this myself. I was present and participated in all key portions of the encounter and all procedures noted above. I have made changes in the body of the note where appropriate.\par \par Attesting Faculty: See Attending Signature Below \par \par \par  [Time Spent: ___ minutes] : I have spent [unfilled] minutes of time on the encounter. 27-May-2023 10:40

## 2023-06-17 ENCOUNTER — EMERGENCY (EMERGENCY)
Facility: HOSPITAL | Age: 48
LOS: 1 days | Discharge: HOME | End: 2023-06-17
Attending: STUDENT IN AN ORGANIZED HEALTH CARE EDUCATION/TRAINING PROGRAM
Payer: COMMERCIAL

## 2023-06-17 VITALS
TEMPERATURE: 99 F | OXYGEN SATURATION: 99 % | RESPIRATION RATE: 18 BRPM | HEART RATE: 97 BPM | WEIGHT: 121.92 LBS | DIASTOLIC BLOOD PRESSURE: 70 MMHG | SYSTOLIC BLOOD PRESSURE: 117 MMHG

## 2023-06-17 VITALS
DIASTOLIC BLOOD PRESSURE: 72 MMHG | HEART RATE: 75 BPM | OXYGEN SATURATION: 98 % | RESPIRATION RATE: 18 BRPM | TEMPERATURE: 98 F | SYSTOLIC BLOOD PRESSURE: 109 MMHG

## 2023-06-17 PROCEDURE — 73630 X-RAY EXAM OF FOOT: CPT

## 2023-06-17 PROCEDURE — 99283 EMERGENCY DEPT VISIT LOW MDM: CPT

## 2023-06-17 PROCEDURE — 73610 X-RAY EXAM OF ANKLE: CPT | Mod: 26,LT

## 2023-06-17 PROCEDURE — 73610 X-RAY EXAM OF ANKLE: CPT

## 2023-06-17 PROCEDURE — 73630 X-RAY EXAM OF FOOT: CPT | Mod: 26,LT

## 2023-06-17 PROCEDURE — 99284 EMERGENCY DEPT VISIT MOD MDM: CPT | Mod: 25

## 2023-06-17 RX ORDER — ACETAMINOPHEN 500 MG
975 TABLET ORAL ONCE
Refills: 0 | Status: COMPLETED | OUTPATIENT
Start: 2023-06-17 | End: 2023-06-17

## 2023-06-17 RX ADMIN — Medication 975 MILLIGRAM(S): at 18:42

## 2023-06-17 NOTE — ED ADULT NURSE NOTE - OBJECTIVE STATEMENT
Female 47 years old with past medical history of Meniere's Disease, came in for foot pain/injury. Pt states she was standing on radiator while hanging drapes, step backward and fell hyperextending her left foot. Left foot is bruise and swollen and abrasion at her sole. Denies head injury, numbness or tingling. Will monitor.

## 2023-06-17 NOTE — ED PROVIDER NOTE - PHYSICAL EXAMINATION
Const: Well-nourished, Well-developed, appearing stated age.  Eyes: no conjunctival injection, and symmetrical lids.  HEENT: Head NCAT, no lesions. Atraumatic external nose and ears. Moist MM.  Neck: Symmetric, trachea midline.   RESP: Unlabored respiratory effort.   MSK:  1 cm hematoma on the plantar surface of the left foot.  TTP over metatarsals.  ROM of left ankle limited due to pain.   Skin: Warm, dry and intact.   Neuro: CNs II-XII grossly intact. Motor & Sensation grossly intact.  Psych: Awake, Alert, & Oriented (AAO) x3. Appropriate mood and affect.

## 2023-06-17 NOTE — ED ADULT NURSE NOTE - NSFALLRISKINTERV_ED_ALL_ED

## 2023-06-17 NOTE — ED PROVIDER NOTE - PATIENT PORTAL LINK FT
You can access the FollowMyHealth Patient Portal offered by [facility] by registering at the following website: [email or web address]. By joining [employer]’s FollowMyHealth portal, you will also be able to view your health information using other applications (apps) compatible with our system.

## 2023-06-17 NOTE — ED ADULT NURSE NOTE - TEMPLATE LIST FOR HEAD TO TOE ASSESSMENT
"I have reviewed the surgical (or preoperative) H&P that is linked to this encounter, and examined the patient. There are no significant changes    Clinical Conditions Present on Arrival:  Clinically Significant Risk Factors Present on Admission                   # Obesity: Estimated body mass index is 38.04 kg/m  as calculated from the following:    Height as of 7/27/22: 1.753 m (5' 9\").    Weight as of 7/27/22: 116.8 kg (257 lb 9 oz).       "
General

## 2023-06-17 NOTE — ED PROVIDER NOTE - NSICDXFAMILYHX_GEN_ALL_CORE_FT
FAMILY HISTORY:  Father  Still living? Unknown  Family history of Meniere's disease, Age at diagnosis: Age Unknown  Family history of vertigo, Age at diagnosis: Age Unknown

## 2023-06-17 NOTE — ED ADULT NURSE NOTE - CHIEF COMPLAINT QUOTE
pt c/o L foot pain/abrasion. pt was standing on her radiator fell off and hyperextended her toes when landing.

## 2023-06-17 NOTE — ED PROVIDER NOTE - OBJECTIVE STATEMENT
47-year-old female not on any AC or aspirin presents to the ED  with left foot pain after patient was standing on a 3 feet high radiator that slipped and fell over.  Did not hit her head, no LOC, no other pain or injuries to the body.  Has not been able to bear weight.  Took Motrin prior to coming.  Complains of pain mostly in the midtarsal region.

## 2023-06-17 NOTE — ED PROVIDER NOTE - ATTENDING CONTRIBUTION TO CARE
I, Parth Jeffery, performed a history and physical exam of the patient and discussed their management with the resident and /or advanced care provider. I reviewed the resident and /or ACP's note and agree with the documented findings and plan of care. I was present and available for all procedures.    47-year-old female with PMH of Ménière's disease presents with left foot pain status post mechanical fall.  Patient was standing on top of a radiator cover trying to screw coming into the wall with a drill and while applying pressure she tilted backwards landing most of the force on her left foot and denies any head strike, loss of consciousness or any other additional trauma.  Patient states that the pain was not bad but after a few hours she started noticing worsening swelling and felt a little bit of numbness in the foot and pain became worse prodding her to come to the ED.  Patient denied taking any pain medication states that she has been elevating the foot and applying ice.  Patient states that she up-to-date with tetanus.    On exam most of the pain is at the top of the foot near the navicular area and talus, no tenderness to palpation along the medial or lateral malleolus, no pain when squeezing the upper ankle, no pain of the lower leg or of the knee.  Small hematoma appreciated at the base of the second toe as well as on the lateral side of the big toe without any active bleeding.  Patient able to wiggle her toes without issue, pain mostly with lateral range of motion especially against resistance.    Patient likely with foot fracture and obtaining x-rays, patient declining pain medication at this time.  The wound/hematoma to the patient has do not need any repair otherwise general cleaning, will obtain x-rays of the foot and ankle.

## 2023-06-17 NOTE — ED PROVIDER NOTE - NSFOLLOWUPINSTRUCTIONS_ED_ALL_ED_FT
Was a pleasure caring for you.  X-rays show no fractures at this time.  If your pain does not resolve, you may follow-up with the orthopedic team.  The hospital call you to help you make an appointment the next 2-3 business days.  You may take 500-1000 mg acetaminophen every 6 hours, as needed for pain  You may take 600 mg ibuprofen every 8 hours, with food, as needed for pain    please return to the hospital if you have any new or worsening symptoms including worsening pain, worsening swelling, inability to walk, fevers or chills.

## 2023-11-16 NOTE — ED ADULT TRIAGE NOTE - CHIEF COMPLAINT QUOTE
pt c/o L foot pain/abrasion. pt was standing on her radiator fell off and hyperextended her toes when landing. details…

## 2023-11-22 NOTE — ED ADULT NURSE NOTE - CAS TRG GEN SKIN COLOR
Patient called stating that her urine is dark brown and she has lots of bladder pressure. Patient is amenable to going to the walk in for care. Normal for race

## 2024-04-23 ENCOUNTER — NON-APPOINTMENT (OUTPATIENT)
Age: 49
End: 2024-04-23

## 2024-04-26 ENCOUNTER — EMERGENCY (EMERGENCY)
Facility: HOSPITAL | Age: 49
LOS: 1 days | Discharge: HOME | End: 2024-04-26
Attending: STUDENT IN AN ORGANIZED HEALTH CARE EDUCATION/TRAINING PROGRAM
Payer: COMMERCIAL

## 2024-04-26 VITALS
WEIGHT: 120.15 LBS | HEIGHT: 67.5 IN | TEMPERATURE: 98 F | HEART RATE: 82 BPM | SYSTOLIC BLOOD PRESSURE: 122 MMHG | OXYGEN SATURATION: 99 % | RESPIRATION RATE: 18 BRPM | DIASTOLIC BLOOD PRESSURE: 85 MMHG

## 2024-04-26 VITALS
OXYGEN SATURATION: 100 % | SYSTOLIC BLOOD PRESSURE: 98 MMHG | DIASTOLIC BLOOD PRESSURE: 70 MMHG | HEART RATE: 78 BPM | RESPIRATION RATE: 16 BRPM

## 2024-04-26 PROCEDURE — 99284 EMERGENCY DEPT VISIT MOD MDM: CPT

## 2024-04-26 PROCEDURE — 99283 EMERGENCY DEPT VISIT LOW MDM: CPT

## 2024-04-26 RX ORDER — LIDOCAINE 4 G/100G
1 CREAM TOPICAL ONCE
Refills: 0 | Status: COMPLETED | OUTPATIENT
Start: 2024-04-26 | End: 2024-04-26

## 2024-04-26 RX ORDER — IBUPROFEN 200 MG
600 TABLET ORAL ONCE
Refills: 0 | Status: COMPLETED | OUTPATIENT
Start: 2024-04-26 | End: 2024-04-26

## 2024-04-26 RX ORDER — ACETAMINOPHEN 500 MG
975 TABLET ORAL ONCE
Refills: 0 | Status: COMPLETED | OUTPATIENT
Start: 2024-04-26 | End: 2024-04-26

## 2024-04-26 RX ORDER — DIAZEPAM 5 MG
5 TABLET ORAL ONCE
Refills: 0 | Status: DISCONTINUED | OUTPATIENT
Start: 2024-04-26 | End: 2024-04-26

## 2024-04-26 RX ORDER — DIAZEPAM 5 MG
1 TABLET ORAL
Qty: 12 | Refills: 0
Start: 2024-04-26 | End: 2024-04-29

## 2024-04-26 RX ADMIN — LIDOCAINE 1 PATCH: 4 CREAM TOPICAL at 06:30

## 2024-04-26 RX ADMIN — Medication 975 MILLIGRAM(S): at 06:29

## 2024-04-26 RX ADMIN — Medication 600 MILLIGRAM(S): at 06:29

## 2024-04-26 RX ADMIN — Medication 5 MILLIGRAM(S): at 06:29

## 2024-04-26 NOTE — ED ADULT NURSE NOTE - NSFALLRISKINTERV_ED_ALL_ED
Assistance OOB with selected safe patient handling equipment if applicable/Communicate fall risk and risk factors to all staff, patient, and family/Encourage patient to sit up slowly, dangle for a short time, stand at bedside before walking/Orthostatic vital signs/Provide visual cue: yellow wristband, yellow gown, etc/Reinforce activity limits and safety measures with patient and family/Review medications for side effects contributing to fall risk/Toileting schedule using arm’s reach rule for commode and bathroom/Call bell, personal items and telephone in reach/Instruct patient to call for assistance before getting out of bed/chair/stretcher/Non-slip footwear applied when patient is off stretcher/[place] to call system/Physically safe environment - no spills, clutter or unnecessary equipment/Purposeful Proactive Rounding/Room/bathroom lighting operational, light cord in reach

## 2024-04-26 NOTE — ED PROVIDER NOTE - ATTENDING CONTRIBUTION TO CARE
Attending (Moncho Kinney D.O.):  I have personally seen and examined this patient. I have performed a substantive portion of the visit including all aspects of the medical decision making. Resident, fellow, student, and/or ACP note reviewed. I agree on the plan of care except where noted.    young female hx of herniated cervical discs , c4-t3, works as a  here for bilateral neck stiffness and pain, worse with swallowing but no sore throat, no fever, seems to have triggered 2/2 job, looking at a computer screen .Similar sxs in 2018. Improved with PT and time. Only taking 1 motrin w/o sig improvement. Occasionally with LUE and then RUE tingling/paresthesias. I suspect these are 2/2 muscle spams of the trap and paracervical muscles, affecting the herniated cervical discs. There is no trauma. Patient here is hemodynam stable, uncomfortable,  + bilateral cervical muscle/trap hypertonicity, no other spinal pain/tenderness, do not think meningitis. clear oropharyxn, no inc wob, steady gait. No rash. Suspect this is musclar spasm in etiology, will take time. Plan for supportive care, reassessments. Expectations managed.    -> (07:00)  patient signed out pending remainder of w/u, close reassessments, discussion of results, dispo.

## 2024-04-26 NOTE — ED PROVIDER NOTE - PATIENT PORTAL LINK FT
You can access the FollowMyHealth Patient Portal offered by [facility] by registering at the following website: [email or web address]. By joining [employer]’s FollowMyHealth portal, you will also be able to view your health information using other applications (apps) compatible with our system.

## 2024-04-26 NOTE — ED PROVIDER NOTE - PROGRESS NOTE DETAILS
improved neck pain and ROM, no neuro deficits. pt given instructions about medications for sx control.

## 2024-04-26 NOTE — ED ADULT NURSE REASSESSMENT NOTE - NS ED NURSE REASSESS COMMENT FT1
Received report from JATINDER Cox.  Pt a&ox3, breathing spontaneous and unlabored, following commands and moving all extremities, skin warm and dry.  Pt endorsing partial relief s/p pain medication.  Bed locked and in lowest position.  Pt  bedside. Received report from JATINDER Cox.  Pt a&ox3, breathing spontaneous and unlabored, following commands and moving all extremities, skin warm and dry.  Pt endorsing partial relief s/p pain medication stating shes feeling better from when she arrived and now able to turn her head side to side.  Bed locked and in lowest position.  Pt  bedside.

## 2024-04-26 NOTE — ED PROVIDER NOTE - NSFOLLOWUPINSTRUCTIONS_ED_ALL_ED_FT
Please follow up with your doctor within 1 week. Bring copies of your results with you (provided in your discharge paperwork). Please stay well-hydrated.    You may take 500-1000 mg acetaminophen (tylenol) every 6 hours, as needed for pain.  You may take 600 mg ibuprofen every 8 hours, with food, as needed for pain.  You can take tylenol and ibuprofen at the same time.     ***VALIUM SCRIP***    PLEASE RETURN TO ED FOR NEW OR WORSENING SYMPTOMS (intractable nausea/vomiting, severe bleeding, fever over 100.4F, loss of movement of one or more limbs, seizure)    Finally, please follow up with [your PCP/a specialist] regarding the incidental findings seen on your imaging (please see discharge paperwork for results). Please follow up with your doctor within 1 week. Bring copies of your results with you (provided in your discharge paperwork). Please stay well-hydrated.    You may take 500-1000 mg acetaminophen (tylenol) every 6 hours, as needed for pain.  You may take 600 mg ibuprofen every 8 hours, with food, as needed for pain.  You can take tylenol and ibuprofen at the same time.   take additional valium every 8 hours as needed if pain persists.   -can buy lidocaine patch at the pharmacy.     PLEASE RETURN TO ED FOR NEW OR WORSENING SYMPTOMS (intractable nausea/vomiting, severe bleeding, fever over 100.4F, loss of movement of one or more limbs, seizure)    Finally, please follow up with [your PCP/a specialist] regarding the incidental findings seen on your imaging (please see discharge paperwork for results).

## 2024-04-26 NOTE — ED ADULT TRIAGE NOTE - CHIEF COMPLAINT QUOTE
neck pain x 1 week; pmh arthritis, herniated discs; burning sensation in spine; sore throat; has appt with Physio therapy; no fever neck pain x 1 week; pmh arthritis, herniated discs; burning sensation in spine; sore throat; has appt with Physio therapy; no fevers.

## 2024-04-26 NOTE — ED PROVIDER NOTE - NSICDXPASTMEDICALHX_GEN_ALL_CORE_FT
PAST MEDICAL HISTORY:  Cervical herniated disc     Gilbert's disease     Meniere disease     Vertigo

## 2024-04-26 NOTE — ED ADULT NURSE NOTE - CHIEF COMPLAINT QUOTE
neck pain x 1 week; pmh arthritis, herniated discs; burning sensation in spine; sore throat; has appt with Physio therapy; no fevers.

## 2024-04-26 NOTE — ED ADULT NURSE NOTE - OBJECTIVE STATEMENT
48yF hx Meniere's, vertigo, migraines, herniated discs presents with 1 week of neck pain. pt describes pain as "burning" sensation" in her spine. 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 had "normal" blood work with PCP earlier in the week and was supposed to start PT but this mornings pain was unbearable so came to ED. pt describes pain in back of neck and worsening when she swallows feels pain and burning in back/neck muscles. MDs at bedside for eval. will attempt muscle relaxers and reassess.

## 2024-04-26 NOTE — ED PROVIDER NOTE - CLINICAL SUMMARY MEDICAL DECISION MAKING FREE TEXT BOX
48-year-old female with history of Gilbert's disease, vertigo, GERD, Ménière's disease, cervical herniated discs,  presenting with neck pain for 4 days.  Patient states she has had left-sided neck pain, severe since Tuesday, radiating up into head, worsened by movement, patient has not trialed pain meds at home for relief due to medical Hx.   Patient works at computer all day, states work has been more difficult this week, neck pain exacerbated by working at a computer all day.   Denies fevers, visual/hearing changes, dizziness/lightheadedness, HA, SOB, CP, sore throat, voice changes, difficulty swallowing,   Trauma/falls.  ROS otherwise negative.  Vital signs reviewed and unremarkable.    General: WN/WD NAD  Head: Atraumatic  Eyes: EOM grossly in tact, no scleral icterus  ENT: dry mucous membranes, no posterior oropharyngeal erythema/edema/exudates; +L-sided MSK C-spine ttp  Back: no T/L paraspinal ttp  Neurology: A&Ox3, nonfocal, MOREAU x 4, gait WNL  Respiratory: normal respiratory effort  CV: Extremities warm and well perfused  Abdominal: Soft, non-distended, non-tender  Extremities: No edema  Skin: No rashes    Pt w/stiff neck likely torticollis vs MSK pain vs less likely cervical radiculopathy, no red flag symptoms or signs to suggest acute fracture/dislocation, pt w/Hx of cervical disc herniation from trauma in 2018, no fevers or findings on exam to suggest meningitis or RPA. Will treat symptomatically with pain meds and DCTH w/ortho follow up, pt has physiotherapy appt pending. This represents an initial assessment; workup and plan subject to change. - Michael Hemphill, PGY-3

## 2024-04-26 NOTE — ED ADULT NURSE NOTE - CCCP TRG CHIEF CMPLNT
Pt was scheduled for his colonoscopy today and was called yesterday and told GI was not in his network - pt requesting new referral neck pain

## 2024-04-29 PROBLEM — M50.20 OTHER CERVICAL DISC DISPLACEMENT, UNSPECIFIED CERVICAL REGION: Chronic | Status: ACTIVE | Noted: 2024-04-26

## 2024-06-07 ENCOUNTER — APPOINTMENT (OUTPATIENT)
Dept: OTOLARYNGOLOGY | Facility: CLINIC | Age: 49
End: 2024-06-07
Payer: COMMERCIAL

## 2024-06-07 VITALS
WEIGHT: 120 LBS | HEART RATE: 77 BPM | DIASTOLIC BLOOD PRESSURE: 75 MMHG | SYSTOLIC BLOOD PRESSURE: 113 MMHG | HEIGHT: 67 IN | BODY MASS INDEX: 18.83 KG/M2

## 2024-06-07 DIAGNOSIS — H81.01 MENIERE'S DISEASE, RIGHT EAR: ICD-10-CM

## 2024-06-07 DIAGNOSIS — Z01.10 ENCOUNTER FOR EXAMINATION OF EARS AND HEARING W/OUT ABNORMAL FINDINGS: ICD-10-CM

## 2024-06-07 PROCEDURE — 99214 OFFICE O/P EST MOD 30 MIN: CPT | Mod: 25

## 2024-06-07 PROCEDURE — 92567 TYMPANOMETRY: CPT

## 2024-06-07 PROCEDURE — 92557 COMPREHENSIVE HEARING TEST: CPT

## 2024-06-07 NOTE — ASSESSMENT
[FreeTextEntry1] : Ms. ROSARIO 48 year F with Right Menieres Disease presents for follow. Dizziness and hearing is improving   Right Menieres Disease: -continue low salt diet, handout given  -acupuncture advised  rx-dyazide poss [facility] f/u   f/u 1 months or prn

## 2024-06-07 NOTE — HISTORY OF PRESENT ILLNESS
[Tinnitus] : tinnitus [Vertigo] : vertigo [Dizziness] : dizziness [Hearing Loss] : hearing loss [None] : The patient is currently asymptomatic. [de-identified] : 48 yo female\par  Patient with hx of Right Menieres Disease presents for follow up. States she has some days where she gets dizzy but not as severe and some days she feels great. Her hearing improved since last visit. She is watching her salt intake.  [FreeTextEntry1] : 6/7/2024 Increased recent dizziness and right aural fullness

## 2024-06-07 NOTE — DATA REVIEWED
[de-identified] : Hearing Test performed to evaluate the extent of hearing loss and  to explain pt's symptoms today's hearing test was personally reviewed and revealed Tymps-wnl Hearing-wnl

## 2024-07-03 ENCOUNTER — APPOINTMENT (OUTPATIENT)
Dept: OTOLARYNGOLOGY | Facility: CLINIC | Age: 49
End: 2024-07-03
Payer: COMMERCIAL

## 2024-07-03 VITALS — DIASTOLIC BLOOD PRESSURE: 65 MMHG | TEMPERATURE: 98 F | HEART RATE: 77 BPM | SYSTOLIC BLOOD PRESSURE: 107 MMHG

## 2024-07-03 DIAGNOSIS — Z01.10 ENCOUNTER FOR EXAMINATION OF EARS AND HEARING W/OUT ABNORMAL FINDINGS: ICD-10-CM

## 2024-07-03 DIAGNOSIS — H81.01 MENIERE'S DISEASE, RIGHT EAR: ICD-10-CM

## 2024-07-03 DIAGNOSIS — R42 DIZZINESS AND GIDDINESS: ICD-10-CM

## 2024-07-03 DIAGNOSIS — G43.809 OTHER MIGRAINE, NOT INTRACTABLE, W/OUT STATUS MIGRAINOSUS: ICD-10-CM

## 2024-07-03 PROCEDURE — 92567 TYMPANOMETRY: CPT

## 2024-07-03 PROCEDURE — 99213 OFFICE O/P EST LOW 20 MIN: CPT | Mod: 25

## 2024-07-03 PROCEDURE — 92557 COMPREHENSIVE HEARING TEST: CPT

## 2024-10-09 ENCOUNTER — NON-APPOINTMENT (OUTPATIENT)
Age: 49
End: 2024-10-09

## 2024-10-09 ENCOUNTER — LABORATORY RESULT (OUTPATIENT)
Age: 49
End: 2024-10-09

## 2024-10-09 ENCOUNTER — APPOINTMENT (OUTPATIENT)
Dept: OBGYN | Facility: CLINIC | Age: 49
End: 2024-10-09
Payer: COMMERCIAL

## 2024-10-09 VITALS
HEIGHT: 67 IN | BODY MASS INDEX: 20.25 KG/M2 | WEIGHT: 129 LBS | SYSTOLIC BLOOD PRESSURE: 110 MMHG | DIASTOLIC BLOOD PRESSURE: 75 MMHG

## 2024-10-09 DIAGNOSIS — N88.9 NONINFLAMMATORY DISORDER OF CERVIX UTERI, UNSPECIFIED: ICD-10-CM

## 2024-10-09 DIAGNOSIS — N92.4 EXCESSIVE BLEEDING IN THE PREMENOPAUSAL PERIOD: ICD-10-CM

## 2024-10-09 DIAGNOSIS — Z01.411 ENCOUNTER FOR GYNECOLOGICAL EXAMINATION (GENERAL) (ROUTINE) WITH ABNORMAL FINDINGS: ICD-10-CM

## 2024-10-09 PROCEDURE — 99459 PELVIC EXAMINATION: CPT

## 2024-10-09 PROCEDURE — 99213 OFFICE O/P EST LOW 20 MIN: CPT | Mod: 25

## 2024-10-09 PROCEDURE — 99386 PREV VISIT NEW AGE 40-64: CPT

## 2024-10-14 LAB — CYTOLOGY CVX/VAG DOC THIN PREP: NORMAL

## 2024-10-17 LAB — HPV HIGH+LOW RISK DNA PNL CVX: DETECTED

## 2024-10-23 ENCOUNTER — NON-APPOINTMENT (OUTPATIENT)
Age: 49
End: 2024-10-23

## 2024-11-01 ENCOUNTER — APPOINTMENT (OUTPATIENT)
Dept: MAMMOGRAPHY | Facility: IMAGING CENTER | Age: 49
End: 2024-11-01
Payer: COMMERCIAL

## 2024-11-01 ENCOUNTER — RESULT REVIEW (OUTPATIENT)
Age: 49
End: 2024-11-01

## 2024-11-01 ENCOUNTER — APPOINTMENT (OUTPATIENT)
Dept: ULTRASOUND IMAGING | Facility: IMAGING CENTER | Age: 49
End: 2024-11-01
Payer: COMMERCIAL

## 2024-11-01 ENCOUNTER — OUTPATIENT (OUTPATIENT)
Dept: OUTPATIENT SERVICES | Facility: HOSPITAL | Age: 49
LOS: 1 days | End: 2024-11-01
Payer: COMMERCIAL

## 2024-11-01 DIAGNOSIS — N92.4 EXCESSIVE BLEEDING IN THE PREMENOPAUSAL PERIOD: ICD-10-CM

## 2024-11-01 PROCEDURE — 76641 ULTRASOUND BREAST COMPLETE: CPT | Mod: 26,50

## 2024-11-01 PROCEDURE — 77063 BREAST TOMOSYNTHESIS BI: CPT | Mod: 26

## 2024-11-01 PROCEDURE — 77067 SCR MAMMO BI INCL CAD: CPT

## 2024-11-01 PROCEDURE — 76830 TRANSVAGINAL US NON-OB: CPT | Mod: 26

## 2024-11-01 PROCEDURE — 76856 US EXAM PELVIC COMPLETE: CPT | Mod: 26,59

## 2024-11-01 PROCEDURE — 77067 SCR MAMMO BI INCL CAD: CPT | Mod: 26

## 2024-11-01 PROCEDURE — 76856 US EXAM PELVIC COMPLETE: CPT

## 2024-11-01 PROCEDURE — 76830 TRANSVAGINAL US NON-OB: CPT

## 2024-11-01 PROCEDURE — 77063 BREAST TOMOSYNTHESIS BI: CPT

## 2024-11-01 PROCEDURE — 76641 ULTRASOUND BREAST COMPLETE: CPT

## 2024-11-14 ENCOUNTER — APPOINTMENT (OUTPATIENT)
Dept: OBGYN | Facility: CLINIC | Age: 49
End: 2024-11-14
Payer: COMMERCIAL

## 2024-11-14 DIAGNOSIS — R87.810 CERVICAL HIGH RISK HUMAN PAPILLOMAVIRUS (HPV) DNA TEST POSITIVE: ICD-10-CM

## 2024-11-14 LAB — HCG UR QL: NEGATIVE

## 2024-11-14 PROCEDURE — 57454 BX/CURETT OF CERVIX W/SCOPE: CPT

## 2024-11-14 PROCEDURE — 81025 URINE PREGNANCY TEST: CPT

## 2024-11-18 LAB — CORE LAB BIOPSY: NORMAL

## 2024-11-22 ENCOUNTER — RESULT REVIEW (OUTPATIENT)
Age: 49
End: 2024-11-22

## 2024-11-22 ENCOUNTER — OUTPATIENT (OUTPATIENT)
Dept: OUTPATIENT SERVICES | Facility: HOSPITAL | Age: 49
LOS: 1 days | End: 2024-11-22
Payer: COMMERCIAL

## 2024-11-22 ENCOUNTER — APPOINTMENT (OUTPATIENT)
Dept: ULTRASOUND IMAGING | Facility: CLINIC | Age: 49
End: 2024-11-22
Payer: COMMERCIAL

## 2024-11-22 ENCOUNTER — APPOINTMENT (OUTPATIENT)
Dept: MAMMOGRAPHY | Facility: CLINIC | Age: 49
End: 2024-11-22
Payer: COMMERCIAL

## 2024-11-22 DIAGNOSIS — Z00.00 ENCOUNTER FOR GENERAL ADULT MEDICAL EXAMINATION WITHOUT ABNORMAL FINDINGS: ICD-10-CM

## 2024-11-22 PROCEDURE — G0279: CPT | Mod: 26

## 2024-11-22 PROCEDURE — 76642 ULTRASOUND BREAST LIMITED: CPT | Mod: 26,LT

## 2024-11-22 PROCEDURE — 76642 ULTRASOUND BREAST LIMITED: CPT

## 2024-11-22 PROCEDURE — 77065 DX MAMMO INCL CAD UNI: CPT | Mod: 26,LT

## 2024-11-22 PROCEDURE — G0279: CPT

## 2024-11-22 PROCEDURE — 77065 DX MAMMO INCL CAD UNI: CPT

## 2025-03-01 DIAGNOSIS — R00.2 PALPITATIONS: ICD-10-CM

## 2025-03-04 ENCOUNTER — APPOINTMENT (OUTPATIENT)
Dept: CARDIOLOGY | Facility: CLINIC | Age: 50
End: 2025-03-04